# Patient Record
Sex: FEMALE | Race: WHITE | NOT HISPANIC OR LATINO | Employment: OTHER | URBAN - METROPOLITAN AREA
[De-identification: names, ages, dates, MRNs, and addresses within clinical notes are randomized per-mention and may not be internally consistent; named-entity substitution may affect disease eponyms.]

---

## 2017-02-22 ENCOUNTER — OFFICE VISIT (OUTPATIENT)
Dept: FAMILY MEDICINE | Facility: HOSPITAL | Age: 32
End: 2017-02-22
Attending: FAMILY MEDICINE
Payer: MEDICAID

## 2017-02-22 VITALS
BODY MASS INDEX: 33.55 KG/M2 | HEART RATE: 73 BPM | DIASTOLIC BLOOD PRESSURE: 86 MMHG | WEIGHT: 170.88 LBS | HEIGHT: 60 IN | SYSTOLIC BLOOD PRESSURE: 130 MMHG

## 2017-02-22 DIAGNOSIS — Z85.850 HX OF PAPILLARY THYROID CARCINOMA: ICD-10-CM

## 2017-02-22 DIAGNOSIS — E89.0 POSTOPERATIVE HYPOTHYROIDISM: ICD-10-CM

## 2017-02-22 DIAGNOSIS — R41.840 POOR CONCENTRATION: Primary | ICD-10-CM

## 2017-02-22 DIAGNOSIS — E56.9 MULTIPLE VITAMIN DEFICIENCY: ICD-10-CM

## 2017-02-22 PROCEDURE — 99214 OFFICE O/P EST MOD 30 MIN: CPT | Performed by: FAMILY MEDICINE

## 2017-02-22 NOTE — PROGRESS NOTES
Subjective:       Patient ID: Monique Rai is a 31 y.o. female.    Chief Complaint: Follow-up (Thyroid)    HPI Comments: This is a 30 yo F with PMH of hypothyroidism 2/2 thyroidectomy for papillary thyroid cancer in 2015.  Patient is here today with chief complaint of difficulty concentrating.  She says this has been something she has struggled with since high school and that she used to take medication (ritalin, adderral) for it.  She was never officially tested or diagnosed and does not currently have a psychiatrist.  She says she noticed her ability to concentrate was worsening after her thyroidectomy but that she wanted to situate other problems in her life before bringing it up (pregnant at the time, etc).  She says this difficulty causes problems in her daily life and that lately people have been commenting on her lack of ability to focus and pay attention.  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  She feels that this difficulty is aggravating her depression.  Aside from this, she feels her hypothyroid symptoms are well controlled on her current regimen of armour.  She does still experience mild cold intolerance but denies other symptoms.    Since her last clinic visit with us, she has followed up with her endocrinologist (Uriel) who has declared her in remission after completing tumor marker tests and imaging.  She is to continue to follow up with them regularly every 8 weeks.  Synthroid was discontinued and she is currently only on armour now.    She does admit she has not been taking all of her vitamins as prescribed but has been taking her armour.    Review of Systems   Constitutional: Negative.    HENT: Negative.    Eyes: Negative.    Respiratory: Negative.    Cardiovascular: Negative.    Gastrointestinal: Negative.    Endocrine: Positive for cold intolerance.    Genitourinary: Negative.    Musculoskeletal: Negative.    Skin: Negative.    Allergic/Immunologic: Negative.    Neurological: Negative.    Psychiatric/Behavioral: Positive for decreased concentration.       Objective:      Vitals:    02/22/17 0930   BP: 130/86   Pulse: 73     Physical Exam   Constitutional: She is oriented to person, place, and time. She appears well-developed and well-nourished. No distress.   HENT:   Head: Normocephalic and atraumatic.   Mouth/Throat: Oropharynx is clear and moist.   Eyes: EOM are normal. Pupils are equal, round, and reactive to light.   Neck: Neck supple. No thyroid mass present.   Athyroid s/p thyroidectomy, no palpable thyroid tissue remaining, no palpable masses or adenopathy   Cardiovascular: Normal rate, regular rhythm, normal heart sounds and intact distal pulses.  Exam reveals no gallop and no friction rub.    No murmur heard.  Pulmonary/Chest: Effort normal and breath sounds normal. No respiratory distress. She has no wheezes. She has no rales.   Abdominal: Soft. Bowel sounds are normal. She exhibits no distension. There is no tenderness.   Musculoskeletal: She exhibits no edema.   Lymphadenopathy:     She has no cervical adenopathy.   Neurological: She is alert and oriented to person, place, and time.   Skin: Skin is warm and dry.   Psychiatric: She has a normal mood and affect.       Assessment:       1. Poor concentration    2. Hx of papillary thyroid carcinoma    3. Multiple vitamin deficiency    4. Postoperative hypothyroidism        Plan:       Poor concentration  -     Ambulatory referral to Psychiatry     Hx of papillary thyroid carcinoma       - S/P thryoidectomy 2015, no post procedure radiation.  Following with Dr. Trevino, endocrinology.  Currently in remission.  Markers and imaging to be repeated q 8 weeks with endo to monitor for disease.    Multiple vitamin deficiency        - h/o sleeve gastrectomy and dumping syndrome        - discussed importance of  compliance with vitamin regimen and encouraged patient to take all of her medications.    Postoperative hypothyroidism        - well controlled on armour 180 mg       RCT 2 weeks

## 2017-02-23 NOTE — PROGRESS NOTES
I assume primary medical responsibility for this patient, I have reviewed the case history, findings, diagnosis and treatment plan with the resident and agree that the care is reasonable and necessary. This service has been performed by a resident without the presence of a teaching physician under the primary care exception  Leilani St  2/23/2017

## 2017-03-29 ENCOUNTER — OFFICE VISIT (OUTPATIENT)
Dept: FAMILY MEDICINE | Facility: HOSPITAL | Age: 32
End: 2017-03-29
Payer: MEDICAID

## 2017-03-29 VITALS
TEMPERATURE: 98 F | HEART RATE: 84 BPM | WEIGHT: 165.13 LBS | DIASTOLIC BLOOD PRESSURE: 68 MMHG | BODY MASS INDEX: 32.42 KG/M2 | SYSTOLIC BLOOD PRESSURE: 99 MMHG | HEIGHT: 60 IN

## 2017-03-29 DIAGNOSIS — N30.90 CYSTITIS: Primary | ICD-10-CM

## 2017-03-29 PROCEDURE — 99213 OFFICE O/P EST LOW 20 MIN: CPT | Performed by: FAMILY MEDICINE

## 2017-03-29 RX ORDER — SULFAMETHOXAZOLE AND TRIMETHOPRIM 800; 160 MG/1; MG/1
1 TABLET ORAL 2 TIMES DAILY
Qty: 10 TABLET | Refills: 0 | Status: ON HOLD | OUTPATIENT
Start: 2017-03-29 | End: 2017-04-02 | Stop reason: HOSPADM

## 2017-03-29 NOTE — PROGRESS NOTES
Subjective:       Patient ID: Monique Rai is a 32 y.o. female.    Chief Complaint: Fever and Pain    HPI   Patient is a 32 year old female here today with an urgent visit. She is complaining of left flank and back pain that radiates to the left abdomen and extends to the umbilicus. She states the pain started Monday and has progressively gotten worse. She started having a fever last night, and recorded a temperature of 102.5 orally. She rates the pain 3/10 since Monday, with intervals of 10/10 pain lasting for approximately 20 minutes. She states she had a recent bout of constipation last week for which she took a senna leaf, acai, digestive enzyme and probiotic blend sold OTC which provided her relief on Monday. BM have been normal for the patient since Monday. Patient took excedrin starting last night, with the last dose around 8am this morning. She states the fever will return without the excedrin. She complains of urinary frequency and urgency, but denies burning. Pt denies pain in her urine, but states it has been dark, cloudy and malodorous for about 2 weeks. She took an AZO pill this morning. She has a history of UTIs. She complains of nausea, but denies vomiting, denies diarrhea, denies dizziness. LMP just ended and she denies vaginal bleeding or discharge. Patient also denies blood in the stool and states it is her usual color and consistency.    Review of Systems   Constitutional: Positive for appetite change, chills, diaphoresis and fever.   HENT: Negative.  Negative for congestion and sore throat.    Respiratory: Negative.    Cardiovascular: Negative.    Gastrointestinal: Positive for abdominal pain and nausea. Negative for abdominal distention, anal bleeding, blood in stool, constipation, diarrhea and vomiting.   Endocrine: Negative.    Genitourinary: Positive for dysuria, flank pain, frequency and urgency.   Skin: Negative.    Allergic/Immunologic: Negative.    Hematological: Negative.     Psychiatric/Behavioral: Negative.        Objective:      Vitals:    03/29/17 1031   BP: 99/68   Pulse: 84   Temp: 97.7 °F (36.5 °C)     Physical Exam   Constitutional: She is oriented to person, place, and time. She appears well-developed and well-nourished.   Non toxic. NAD   HENT:   Head: Normocephalic and atraumatic.   Eyes: Conjunctivae and EOM are normal. Right eye exhibits no discharge. Left eye exhibits no discharge.   Neck: Normal range of motion. Neck supple.   Cardiovascular: Normal rate, regular rhythm and normal heart sounds.    Pulmonary/Chest: Effort normal and breath sounds normal.   Abdominal: Soft. Bowel sounds are normal. There is tenderness in the periumbilical area, left upper quadrant and left lower quadrant. There is CVA tenderness.   Left CVA tenderness. Pain with palpation of left upper and lower abdomen that elicits radiating pain to the left flank/CVA. NO rebound or guarding   Musculoskeletal: Normal range of motion.   Neurological: She is alert and oriented to person, place, and time.   Skin: Skin is warm and dry.   Psychiatric: She has a normal mood and affect. Her behavior is normal. Judgment and thought content normal.       Assessment:       1. Cystitis        Plan:       Cystitis  -     sulfamethoxazole-trimethoprim 800-160mg (BACTRIM DS) 800-160 mg Tab; Take 1 tablet by mouth 2 (two) times daily.  Dispense: 10 tablet; Refill: 0    Patient advised to return to clinic or ED if she experiences worsening of symptoms. UA and culture not obtained today due to patient taking AZO.  -If pt with recurrent symptoms of UTI, will plan on investigation with urine studies.       Return in about 3 days (around 4/1/2017).

## 2017-03-30 NOTE — PROGRESS NOTES
I assume primary medical responsibility for this patient, I have reviewed the case history, findings, diagnosis and treatment plan with the resident and agree that the care is reasonable and necessary. This service has been performed by a resident without the presence of a teaching physician under the primary care exception  Leilani St  3/30/2017

## 2017-03-31 ENCOUNTER — OFFICE VISIT (OUTPATIENT)
Dept: FAMILY MEDICINE | Facility: HOSPITAL | Age: 32
DRG: 690 | End: 2017-03-31
Attending: FAMILY MEDICINE
Payer: MEDICAID

## 2017-03-31 ENCOUNTER — HOSPITAL ENCOUNTER (INPATIENT)
Facility: HOSPITAL | Age: 32
LOS: 2 days | Discharge: HOME | DRG: 690 | End: 2017-04-02
Attending: EMERGENCY MEDICINE | Admitting: FAMILY MEDICINE
Payer: MEDICAID

## 2017-03-31 VITALS
HEART RATE: 81 BPM | TEMPERATURE: 99 F | WEIGHT: 169.75 LBS | BODY MASS INDEX: 33.15 KG/M2 | DIASTOLIC BLOOD PRESSURE: 63 MMHG | SYSTOLIC BLOOD PRESSURE: 96 MMHG

## 2017-03-31 DIAGNOSIS — R53.81 MALAISE: ICD-10-CM

## 2017-03-31 DIAGNOSIS — N12 PYELONEPHRITIS: Primary | ICD-10-CM

## 2017-03-31 DIAGNOSIS — R53.83 FATIGUE, UNSPECIFIED TYPE: ICD-10-CM

## 2017-03-31 DIAGNOSIS — F41.9 ANXIETY AND DEPRESSION: ICD-10-CM

## 2017-03-31 DIAGNOSIS — N10 ACUTE PYELONEPHRITIS: Primary | ICD-10-CM

## 2017-03-31 DIAGNOSIS — E89.0 POSTOPERATIVE HYPOTHYROIDISM: ICD-10-CM

## 2017-03-31 DIAGNOSIS — F32.A ANXIETY AND DEPRESSION: ICD-10-CM

## 2017-03-31 LAB
ALBUMIN SERPL BCP-MCNC: 3.2 G/DL
ALP SERPL-CCNC: 125 U/L
ALT SERPL W/O P-5'-P-CCNC: 83 U/L
ANION GAP SERPL CALC-SCNC: 10 MMOL/L
AST SERPL-CCNC: 37 U/L
BASOPHILS # BLD AUTO: 0.01 K/UL
BASOPHILS NFR BLD: 0.2 %
BILIRUB SERPL-MCNC: 0.5 MG/DL
BUN SERPL-MCNC: 11 MG/DL
CALCIUM SERPL-MCNC: 8.6 MG/DL
CHLORIDE SERPL-SCNC: 104 MMOL/L
CO2 SERPL-SCNC: 21 MMOL/L
CREAT SERPL-MCNC: 0.9 MG/DL
DIFFERENTIAL METHOD: ABNORMAL
EOSINOPHIL # BLD AUTO: 0.1 K/UL
EOSINOPHIL NFR BLD: 1.7 %
ERYTHROCYTE [DISTWIDTH] IN BLOOD BY AUTOMATED COUNT: 14 %
EST. GFR  (AFRICAN AMERICAN): >60 ML/MIN/1.73 M^2
EST. GFR  (NON AFRICAN AMERICAN): >60 ML/MIN/1.73 M^2
FERRITIN SERPL-MCNC: 71 NG/ML
GLUCOSE SERPL-MCNC: 78 MG/DL
GRAM STN SPEC: NORMAL
HCT VFR BLD AUTO: 31.7 %
HGB BLD-MCNC: 10.2 G/DL
LACTATE SERPL-SCNC: 0.8 MMOL/L
LYMPHOCYTES # BLD AUTO: 0.9 K/UL
LYMPHOCYTES NFR BLD: 16.2 %
MCH RBC QN AUTO: 26.7 PG
MCHC RBC AUTO-ENTMCNC: 32.2 %
MCV RBC AUTO: 83 FL
MONOCYTES # BLD AUTO: 0.6 K/UL
MONOCYTES NFR BLD: 9.9 %
NEUTROPHILS # BLD AUTO: 4.1 K/UL
NEUTROPHILS NFR BLD: 71.8 %
PLATELET # BLD AUTO: 171 K/UL
PMV BLD AUTO: 11 FL
POCT GLUCOSE: 89 MG/DL (ref 70–110)
POTASSIUM SERPL-SCNC: 3.9 MMOL/L
PROT SERPL-MCNC: 6.6 G/DL
RBC # BLD AUTO: 3.82 M/UL
SODIUM SERPL-SCNC: 135 MMOL/L
WBC # BLD AUTO: 5.74 K/UL

## 2017-03-31 PROCEDURE — 63600175 PHARM REV CODE 636 W HCPCS: Performed by: EMERGENCY MEDICINE

## 2017-03-31 PROCEDURE — 83540 ASSAY OF IRON: CPT

## 2017-03-31 PROCEDURE — 36415 COLL VENOUS BLD VENIPUNCTURE: CPT

## 2017-03-31 PROCEDURE — 99284 EMERGENCY DEPT VISIT MOD MDM: CPT | Mod: 25,27

## 2017-03-31 PROCEDURE — 63600175 PHARM REV CODE 636 W HCPCS: Performed by: FAMILY MEDICINE

## 2017-03-31 PROCEDURE — 87086 URINE CULTURE/COLONY COUNT: CPT

## 2017-03-31 PROCEDURE — 96375 TX/PRO/DX INJ NEW DRUG ADDON: CPT

## 2017-03-31 PROCEDURE — 84591 ASSAY OF NOS VITAMIN: CPT | Mod: 91

## 2017-03-31 PROCEDURE — 82728 ASSAY OF FERRITIN: CPT

## 2017-03-31 PROCEDURE — 85025 COMPLETE CBC W/AUTO DIFF WBC: CPT

## 2017-03-31 PROCEDURE — 84597 ASSAY OF VITAMIN K: CPT

## 2017-03-31 PROCEDURE — 25000003 PHARM REV CODE 250: Performed by: FAMILY MEDICINE

## 2017-03-31 PROCEDURE — 84252 ASSAY OF VITAMIN B-2: CPT

## 2017-03-31 PROCEDURE — 96365 THER/PROPH/DIAG IV INF INIT: CPT

## 2017-03-31 PROCEDURE — 82607 VITAMIN B-12: CPT

## 2017-03-31 PROCEDURE — 84590 ASSAY OF VITAMIN A: CPT

## 2017-03-31 PROCEDURE — 84207 ASSAY OF VITAMIN B-6: CPT

## 2017-03-31 PROCEDURE — 83605 ASSAY OF LACTIC ACID: CPT

## 2017-03-31 PROCEDURE — 84446 ASSAY OF VITAMIN E: CPT

## 2017-03-31 PROCEDURE — 84425 ASSAY OF VITAMIN B-1: CPT

## 2017-03-31 PROCEDURE — 94761 N-INVAS EAR/PLS OXIMETRY MLT: CPT

## 2017-03-31 PROCEDURE — 87205 SMEAR GRAM STAIN: CPT

## 2017-03-31 PROCEDURE — 80053 COMPREHEN METABOLIC PANEL: CPT

## 2017-03-31 PROCEDURE — 87040 BLOOD CULTURE FOR BACTERIA: CPT

## 2017-03-31 PROCEDURE — 82652 VIT D 1 25-DIHYDROXY: CPT

## 2017-03-31 PROCEDURE — 82746 ASSAY OF FOLIC ACID SERUM: CPT

## 2017-03-31 PROCEDURE — 84591 ASSAY OF NOS VITAMIN: CPT

## 2017-03-31 PROCEDURE — 11000001 HC ACUTE MED/SURG PRIVATE ROOM

## 2017-03-31 PROCEDURE — 25000003 PHARM REV CODE 250: Performed by: EMERGENCY MEDICINE

## 2017-03-31 RX ORDER — HYDROMORPHONE HYDROCHLORIDE 1 MG/ML
1 INJECTION, SOLUTION INTRAMUSCULAR; INTRAVENOUS; SUBCUTANEOUS
Status: COMPLETED | OUTPATIENT
Start: 2017-03-31 | End: 2017-03-31

## 2017-03-31 RX ORDER — CIPROFLOXACIN 2 MG/ML
400 INJECTION, SOLUTION INTRAVENOUS
Status: DISCONTINUED | OUTPATIENT
Start: 2017-03-31 | End: 2017-04-02 | Stop reason: HOSPADM

## 2017-03-31 RX ORDER — INSULIN ASPART 100 [IU]/ML
1-10 INJECTION, SOLUTION INTRAVENOUS; SUBCUTANEOUS
Status: DISCONTINUED | OUTPATIENT
Start: 2017-03-31 | End: 2017-03-31

## 2017-03-31 RX ORDER — ONDANSETRON 2 MG/ML
4 INJECTION INTRAMUSCULAR; INTRAVENOUS
Status: COMPLETED | OUTPATIENT
Start: 2017-03-31 | End: 2017-03-31

## 2017-03-31 RX ORDER — HYDROMORPHONE HYDROCHLORIDE 1 MG/ML
0.5 INJECTION, SOLUTION INTRAMUSCULAR; INTRAVENOUS; SUBCUTANEOUS EVERY 4 HOURS PRN
Status: DISCONTINUED | OUTPATIENT
Start: 2017-03-31 | End: 2017-04-02 | Stop reason: HOSPADM

## 2017-03-31 RX ORDER — ACETAMINOPHEN 325 MG/1
650 TABLET ORAL ONCE
Status: COMPLETED | OUTPATIENT
Start: 2017-03-31 | End: 2017-03-31

## 2017-03-31 RX ORDER — IBUPROFEN 200 MG
16 TABLET ORAL
Status: DISCONTINUED | OUTPATIENT
Start: 2017-03-31 | End: 2017-04-02 | Stop reason: HOSPADM

## 2017-03-31 RX ORDER — ONDANSETRON 2 MG/ML
4 INJECTION INTRAMUSCULAR; INTRAVENOUS EVERY 6 HOURS PRN
Status: DISCONTINUED | OUTPATIENT
Start: 2017-03-31 | End: 2017-04-02 | Stop reason: HOSPADM

## 2017-03-31 RX ORDER — SODIUM CHLORIDE 9 MG/ML
INJECTION, SOLUTION INTRAVENOUS CONTINUOUS
Status: ACTIVE | OUTPATIENT
Start: 2017-03-31 | End: 2017-04-01

## 2017-03-31 RX ORDER — GLUCAGON 1 MG
1 KIT INJECTION
Status: DISCONTINUED | OUTPATIENT
Start: 2017-03-31 | End: 2017-04-02 | Stop reason: HOSPADM

## 2017-03-31 RX ORDER — ESCITALOPRAM OXALATE 20 MG/1
20 TABLET ORAL DAILY
Status: DISCONTINUED | OUTPATIENT
Start: 2017-04-01 | End: 2017-04-02 | Stop reason: HOSPADM

## 2017-03-31 RX ORDER — IBUPROFEN 200 MG
24 TABLET ORAL
Status: DISCONTINUED | OUTPATIENT
Start: 2017-03-31 | End: 2017-04-02 | Stop reason: HOSPADM

## 2017-03-31 RX ADMIN — HYDROMORPHONE HYDROCHLORIDE 0.5 MG: 1 INJECTION, SOLUTION INTRAMUSCULAR; INTRAVENOUS; SUBCUTANEOUS at 08:03

## 2017-03-31 RX ADMIN — SODIUM CHLORIDE 1000 ML: 0.9 INJECTION, SOLUTION INTRAVENOUS at 03:03

## 2017-03-31 RX ADMIN — ONDANSETRON 4 MG: 2 INJECTION INTRAMUSCULAR; INTRAVENOUS at 08:03

## 2017-03-31 RX ADMIN — CIPROFLOXACIN 400 MG: 2 INJECTION, SOLUTION INTRAVENOUS at 06:03

## 2017-03-31 RX ADMIN — HYDROMORPHONE HYDROCHLORIDE 1 MG: 1 INJECTION, SOLUTION INTRAMUSCULAR; INTRAVENOUS; SUBCUTANEOUS at 03:03

## 2017-03-31 RX ADMIN — ACETAMINOPHEN 650 MG: 325 TABLET ORAL at 08:03

## 2017-03-31 RX ADMIN — CEFTRIAXONE 1 G: 1 INJECTION, SOLUTION INTRAVENOUS at 03:03

## 2017-03-31 RX ADMIN — ONDANSETRON 4 MG: 2 INJECTION INTRAMUSCULAR; INTRAVENOUS at 03:03

## 2017-03-31 RX ADMIN — SODIUM CHLORIDE: 0.9 INJECTION, SOLUTION INTRAVENOUS at 06:03

## 2017-03-31 NOTE — PROGRESS NOTES
Subjective:       Patient ID: Monique Rai is a 32 y.o. female.    Chief Complaint: Pyelonephritis (er follow up)    HPI   33 yo F with PMH of hypothyroidism 2/2 thyroidectomy for papillary thyroid cancer in 2015, s/p gastric bypass surgery presents to clinic for ER f/u. She was seen last night for abd pain and subsequently diagnosed with Acute Pyelo. She as given Rocephin x 1 and started on Keflex. She presents today stating her symptoms are unchanged. Her back pain is worsening and limiting her daily functioning. She reports subjective fevers and chills this AM. She also reports inability to tolerate PO due to nausea. She denies any urinary symptoms        Review of Systems   Constitutional: Positive for chills, diaphoresis, fatigue and fever. Negative for appetite change.   HENT: Negative for congestion, rhinorrhea, sneezing, sore throat, tinnitus and trouble swallowing.    Eyes: Negative for photophobia and itching.   Respiratory: Negative for cough, shortness of breath and wheezing.    Cardiovascular: Negative for chest pain and palpitations.   Gastrointestinal: Negative for abdominal pain, constipation, diarrhea, nausea and vomiting.   Endocrine: Negative for cold intolerance and heat intolerance.   Genitourinary: Negative for dysuria, frequency, urgency, vaginal bleeding, vaginal discharge and vaginal pain.   Musculoskeletal: Positive for back pain. Negative for arthralgias and gait problem.   Skin: Negative for rash.   Neurological: Negative for dizziness, tremors, weakness, light-headedness and headaches.   Hematological: Negative for adenopathy.   Psychiatric/Behavioral: Negative for agitation and sleep disturbance. The patient is not nervous/anxious.        Objective:      Vitals:    03/31/17 1150   BP: 96/63   Pulse: 81   Temp: 98.5 °F (36.9 °C)     Physical Exam   Constitutional: She is oriented to person, place, and time. She appears well-developed and well-nourished. No distress.   Moderate  distress 2/2 to pain   HENT:   Head: Normocephalic and atraumatic.   Right Ear: External ear normal.   Left Ear: External ear normal.   Mouth/Throat: Oropharynx is clear and moist.   Eyes: Conjunctivae are normal. Pupils are equal, round, and reactive to light.   Neck: Normal range of motion. Neck supple. No JVD present. No thyromegaly present.   Cardiovascular: Normal rate, regular rhythm, normal heart sounds and intact distal pulses.    Pulmonary/Chest: Effort normal and breath sounds normal. She has no wheezes. She has no rales.   Abdominal: Soft. Bowel sounds are normal. She exhibits no distension. There is no tenderness.   Musculoskeletal: She exhibits no edema.   Severe CVA tenderness- Left    Lymphadenopathy:     She has no cervical adenopathy.   Neurological: She is alert and oriented to person, place, and time.   Skin: Skin is warm and dry. No rash noted.   Psychiatric: She has a normal mood and affect. Thought content normal.       Assessment:       1. Acute pyelonephritis        Plan:       Acute pyelonephritis  -Urine culture pending- No gram stain obtained  -s/p rocephin x 1 in ED, started on keflex.   -Due to continued pain limiting her daily functioning, borderline hypotension, hx of gastric bypass and inability to tolerate PO hydration, we will advised her to seek inpatient care for IV Abx and Hydration.       Return for Present to ED.

## 2017-03-31 NOTE — ED NOTES
Pt was sent from [facility] clinic for admission and IV antibiotics. Reports she was seen here for flank pain on Wednesday and started on bactrim. Had no improvement, so returned last night. Was diagnosed with acute pyelonephritis, received IV rocephin, and was sent home with PO Keflex last night, but denies any improvement. Pt c/o generalized abdominal pain and bilateral flank pain. Pain is worst on left side. Left abdomen is tender to palpation. Also c/o nausea. Denies fever. Skin is warm, dry.

## 2017-03-31 NOTE — H&P
History & Physical  [facility]      SUBJECTIVE:     History of Present Illness:  33 y/o female with PMH of recurrent UTI's and s/p gastric bypass surgery in 2014, and s/p thyroidectomy for thyroid CA, and periodic hypoglycemia events here for left-sided abdominal pain and fever at home up to 102F. Of note, pt was seen in clinic and the ER (last night) recently and diagnosed with acute pyelo and given Rocephin x 1 and Keflex PO without change in her symptoms.     Nothing makes her pain better and movement and palpation makes the pain worse. She states she was constipated all last week and took stool softeners, and had several bowel movements on Monday, since that time no BM's. Denies HA, SOB, CP, n/v/d. No vaginal bleeding. Denies blood in her stool or urine.       (Not in a hospital admission)    Review of patient's allergies indicates:  No Known Allergies    Past Medical History:   Diagnosis Date    Cancer     Thyroid disease      Past Surgical History:   Procedure Laterality Date    STOMACH SURGERY       History reviewed. No pertinent family history.  Social History   Substance Use Topics    Smoking status: Current Some Day Smoker     Packs/day: 0.50     Years: 3.00    Smokeless tobacco: None    Alcohol use No        Review of Systems:  As per Subjective  Pos: L-CVA tenderness, nausea, fever  Neg: CP, SOB, chills    OBJECTIVE:     Vital Signs (Most Recent)  Temp: 99.6 °F (37.6 °C) (03/31/17 1424)  Pulse: 89 (03/31/17 1424)  Resp: 20 (03/31/17 1424)  BP: 130/69 (03/31/17 1424)  SpO2: 100 % (03/31/17 1424)    Physical Exam:  Gen: NAD  HEENT: NC, AT  Chest: CTABL  CVS: RRR, no m/r/g  Abdomen: soft, ND, no masses, +BS, LLQ TTP and L-CVA tenderness  Ext: no edema, pulses 2+   Skin: ro rashes  Neuro: A&O x 3, CN's grossly intact, sensation intact to light touch  Psych: Normal mood, affect, thought content    LABS  CBC    Recent Labs  Lab 03/30/17 2037 03/31/17  1533   WBC 7.66 5.74   RBC 4.40 3.82*    HGB 11.9* 10.2*   HCT 36.3* 31.7*    171   MCV 83 83   MCH 27.0 26.7*   MCHC 32.8 32.2     BMP    Recent Labs  Lab 03/30/17 2113 03/31/17  1533    135*   K 4.0 3.9   CO2 19* 21*    104   BUN 12 11   CREATININE 0.9 0.9       POCT-Glucose  No results found for: POCTGLUCOSE      Recent Labs  Lab 03/30/17 2113 03/31/17  1533   CALCIUM 8.1* 8.6*     LFT    Recent Labs  Lab 03/30/17 2113 03/31/17  1533   PROT 6.3 6.6   ALBUMIN 3.1* 3.2*   BILITOT 0.5 0.5   AST 73* 37   ALKPHOS 125 125   * 83*     UA    Recent Labs  Lab 03/30/17  2047   COLORU Yellow   SPECGRAV 1.025   PHUR 6.0   PROTEINUA 1+*   BACTERIA Few*     LAST HbA1c  No results found for: HGBA1C      Diagnostic Results:  Imaging Results     None          ASSESSMENT/PLAN:   31 y/o female with PMH of recurrent UTI's, s/p gastric bypass, s/p thyroidectomy for thyroid CA, and periodic hypoglycemia events here for left-sided abdominal pain and fever at home up to 102F.    Plan: Admit to inpatient for acute pyelonephritis    Pyelonephritis  - VSS  - Fever at home up to 102, but temp 99.6 on admit  - WBC wnl   - H&H stable  - LA pending  - CT suspicious for pyelo with no hydro  - UA with 1+ leuks, 1+ protein, trace blood, and trace ketones  - Urine micro, few bacteria  - POCT urine preg - negative  - Pain control with Dilaudid  - IV fluids  - Rocephin 1g q24H  - Phenergan for nausea    Hypothyroid  - Pt takes Armor at home    Ppx: SCD's  Diet: regular    Dispo: f/u urine and blood Cx's, monitor pain, fluids    3/31/2017 Patrick Swan M.D.

## 2017-03-31 NOTE — ED PROVIDER NOTES
Encounter Date: 3/31/2017       History     Chief Complaint   Patient presents with    Flank Pain     right and left flank pain from kidney infection; was sent her for further evaluation by Dr Lopez because oral antibiotics since Wednesday have not improved and received IV antibiotic here last night     Review of patient's allergies indicates:  No Known Allergies  HPI Comments: Patient is a 32 year old female with left pyelonephritis who is here to be admitted.  + fever, chills, left flank pain, nausea.  No vomiting.  No diarrhea.  Constant malaise.  Nothing makes better including Keflex and Norco.  Severity - severe.  NO hypotension, lightheadedness.     The history is provided by the patient.     Past Medical History:   Diagnosis Date    Cancer     Thyroid disease      Past Surgical History:   Procedure Laterality Date    STOMACH SURGERY       History reviewed. No pertinent family history.  Social History   Substance Use Topics    Smoking status: Current Some Day Smoker     Packs/day: 0.50     Years: 3.00    Smokeless tobacco: None    Alcohol use No     Review of Systems   Constitutional: Positive for chills, fatigue and fever. Negative for diaphoresis.   HENT: Negative for sore throat.    Eyes: Negative for visual disturbance.   Respiratory: Negative for shortness of breath and wheezing.    Cardiovascular: Negative for chest pain and palpitations.   Gastrointestinal: Positive for nausea. Negative for abdominal pain, diarrhea and vomiting.   Genitourinary: Positive for flank pain. Negative for dysuria, pelvic pain and vaginal discharge.   Musculoskeletal: Negative for back pain, joint swelling and neck stiffness.   Skin: Negative for rash.   Neurological: Negative for weakness and numbness.   Hematological: Does not bruise/bleed easily.       Physical Exam   Initial Vitals   BP Pulse Resp Temp SpO2   03/31/17 1424 03/31/17 1424 03/31/17 1424 03/31/17 1424 03/31/17 1424   130/69 89 20 99.6 °F (37.6 °C) 100 %      Physical Exam    Nursing note and vitals reviewed.  Constitutional: She appears well-developed and well-nourished. No distress.   HENT:   Head: Normocephalic and atraumatic.   Eyes: Conjunctivae are normal.   Neck: Normal range of motion. Neck supple.   Cardiovascular: Normal rate and regular rhythm.   Pulmonary/Chest: Breath sounds normal. No respiratory distress. She has no wheezes.   Abdominal: Soft. There is tenderness (mild llq tenderness). There is no rebound and no guarding.   Left CVA tenderness   Musculoskeletal: Normal range of motion.   Neurological: She is alert and oriented to person, place, and time. She has normal strength. No cranial nerve deficit or sensory deficit.   Skin: Skin is warm and dry. No rash noted.   Psychiatric: She has a normal mood and affect.         ED Course   Procedures  Labs Reviewed   CULTURE, BLOOD   CULTURE, BLOOD   LACTIC ACID, PLASMA   CBC W/ AUTO DIFFERENTIAL   COMPREHENSIVE METABOLIC PANEL                               ED Course     Clinical Impression:   The primary encounter diagnosis was Pyelonephritis. A diagnosis of Malaise was also pertinent to this visit.          Chidi Enrique MD  03/31/17 1507       Chidi Enrique MD  03/31/17 1509

## 2017-03-31 NOTE — IP AVS SNAPSHOT
[facility]  [address]  [place] [address]  Phone: [phone]           Patient Discharge Instructions   Our goal is to set you up for success. This packet includes information on your condition, medications, and your home care.  It will help you care for yourself to prevent having to return to the hospital.     Please ask your nurse if you have any questions.      There are many details to remember when preparing to leave the hospital. Here is what you will need to do:    1. Take your medicine. If you are prescribed medications, review your Medication List on the following pages. You may have new medications to  at the pharmacy and others that you'll need to stop taking. Review the instructions for how and when to take your medications. Talk with your doctor or nurses if you are unsure of what to do.     2. Go to your follow-up appointments. Specific follow-up information is listed in the following pages. Your may be contacted by a nurse or clinical provider about future appointments. Be sure we have all of the phone numbers to reach you. Please contact your provider's office if you are unable to make an appointment.     3. Watch for warning signs. Your doctor or nurse will give you detailed warning signs to watch for and when to call for assistance. These instructions may also include educational information about your condition. If you experience any of warning signs to your health, call your doctor.               ** Verify the list of medication(s) below is accurate and up to date. Carry this with you in case of emergency. If your medications have changed, please notify your healthcare provider.             Medication List      START taking these medications        Additional Info                      ciprofloxacin HCl 500 MG tablet   Commonly known as:  CIPRO   Quantity:  8 tablet   Refills:  0   Dose:  500 mg    Instructions:  Take 1 tablet (500 mg total) by mouth every 12  (twelve) hours.     Begin Date    AM    Noon    PM    Bedtime         CONTINUE taking these medications        Additional Info                      acarbose 25 MG Tab   Commonly known as:  PRECOSE   Refills:  11    Instructions:  TK 1 T PO TID WITH MEALS     Begin Date    AM    Noon    PM    Bedtime       ARMOUR THYROID 180 mg Tab   Refills:  11   Generic drug:  thyroid (pork)    Last time this was given:  1 tablet on 4/2/2017  6:11 AM   Instructions:  TK 1 T PO QAM BEFORE BREAKFAST     Begin Date    AM    Noon    PM    Bedtime       B-12 KIT INJ   Refills:  0    Instructions:  Inject as directed every 30 days.     Begin Date    AM    Noon    PM    Bedtime       cholecalciferol (vitamin D3) 50,000 unit capsule   Quantity:  6 capsule   Refills:  0   Dose:  21979 Units   Comments:  One tablet PO every 7th day for 6 weeks.    Instructions:  Take 1 capsule (50,000 Units total) by mouth every 7 days.     Begin Date    AM    Noon    PM    Bedtime       escitalopram oxalate 20 MG tablet   Commonly known as:  LEXAPRO   Quantity:  30 tablet   Refills:  11   Dose:  20 mg    Last time this was given:  20 mg on 4/2/2017  8:51 AM   Instructions:  Take 1 tablet (20 mg total) by mouth once daily.     Begin Date    AM    Noon    PM    Bedtime       hydrocodone-acetaminophen 7.5-325mg 7.5-325 mg per tablet   Commonly known as:  NORCO   Quantity:  12 tablet   Refills:  0   Dose:  1 tablet    Instructions:  Take 1 tablet by mouth every 6 (six) hours as needed for Pain.     Begin Date    AM    Noon    PM    Bedtime       lactulose 20 gram/30 mL Soln   Commonly known as:  CHRONULAC   Quantity:  150 mL   Refills:  0   Dose:  10 g    Instructions:  Take 15 mLs (10 g total) by mouth 2 (two) times daily as needed (Constipation).     Begin Date    AM    Noon    PM    Bedtime       ondansetron 4 MG tablet   Commonly known as:  ZOFRAN   Quantity:  10 tablet   Refills:  0   Dose:  4 mg    Instructions:  Take 1 tablet (4 mg total) by mouth every  6 (six) hours as needed.     Begin Date    AM    Noon    PM    Bedtime       VITAMIN C 1000 MG tablet   Refills:  0   Dose:  1000 mg   Generic drug:  ascorbic acid (vitamin C)    Instructions:  Take 1,000 mg by mouth once daily.     Begin Date    AM    Noon    PM    Bedtime         STOP taking these medications     cephALEXin 500 MG capsule   Commonly known as:  KEFLEX       sulfamethoxazole-trimethoprim 800-160mg 800-160 mg Tab   Commonly known as:  BACTRIM DS            Where to Get Your Medications      You can get these medications from any pharmacy     Bring a paper prescription for each of these medications     ciprofloxacin HCl 500 MG tablet                  Please bring to all follow up appointments:    1. A copy of your discharge instructions.  2. All medicines you are currently taking in their original bottles.  3. Identification and insurance card.    Please arrive 15 minutes ahead of scheduled appointment time.    Please call 24 hours in advance if you must reschedule your appointment and/or time.        Follow-up Information     Follow up with Ran Lopez MD In 1 week.    Specialty:  Family Medicine    Contact information:    [address] [place] [address]  [place] [id number] [phone]          Discharge Instructions     Future Orders    Activity as tolerated     Call MD for:  difficulty breathing or increased cough     Call MD for:  increased confusion or weakness     Call MD for:  persistent dizziness, light-headedness, or visual disturbances     Call MD for:  persistent nausea and vomiting or diarrhea     Call MD for:  redness, tenderness, or signs of infection (pain, swelling, redness, odor or green/yellow discharge around incision site)     Call MD for:  severe persistent headache     Call MD for:  severe uncontrolled pain     Call MD for:  temperature >100.4     Call MD for:  worsening rash     Diet general     Questions:    Total calories:      Fat restriction, if any:      Protein restriction, if any:    "   Na restriction, if any:      Fluid restriction:      Additional restrictions:        Discharge References/Attachments     CIPROFLOXACIN TABLETS (ENGLISH)        Primary Diagnosis     Your primary diagnosis was:  Kidney Infection      Admission Information     Date & Time Provider Department CSN    3/31/2017  2:40 PM Boy Malone III, MD [facility] [id number]      Care Providers     Provider Role Specialty Primary office phone    Boy Malone III, MD Attending Provider Family Medicine [phone]      Your Vitals Were     BP Pulse Temp Resp Height Weight    105/52 (Patient Position: Lying, BP Method: Automatic) 68 97.4 °F (36.3 °C) (Oral) 17 5' 4" (1.626 m) 77.1 kg (170 lb)    Last Period SpO2 BMI          03/21/2017 (Exact Date) 98% 29.18 kg/m2        Recent Lab Values     No lab values to display.      Pending Labs     Order Current Status    Calcitriol In process    Niacin (vitamin B3) In process    Vitam B7, H (Biotin) In process    Vitamin A In process    Vitamin B1 In process    Vitamin B2 In process    Vitamin B5 (Pantothenic Acid) In process    Vitamin B6 In process    Vitamin E In process    Vitamin K In process    Blood Culture #2 **CANNOT BE ORDERED STAT** Preliminary result    Blood culture Preliminary result      Allergies as of 4/2/2017     No Known Allergies      [facility]     [facility] Nurse Care Line - 24/7 Assistance  Unless otherwise directed by your provider, please contact [facility], our nurse care line that is available for 24/7 assistance.     Registered nurses in the [facility] provide clinical advisement, health education, appointment booking, and other advisory services.  Call for this free service at [phone].        Advance Directives     An advance directive is a document which, in the event you are no longer able to make decisions for yourself, tells your healthcare team what kind of treatment you do or do not want to receive, or " who you would like to make those decisions for you.  If you do not currently have an advance directive, [facility] encourages you to create one.  For more information call:  [phone]), [phone] ([phone]),  or log on to [email or web address].        Smoking Cessation     If you would like to quit smoking:   You may be eligible for free services if you are a [place] resident and started smoking cigarettes before September 1, 1988.  Call the Smoking Cessation Trust (SCT) toll free at ([phone]) 308-6052 or [phone].   Call 9-156-QUIT-NOW if you do not meet the above criteria.   Contact us via email: [email or web address]   View our website for more information: [email or web address]        Language Assistance Services     ATTENTION: Language assistance services are available, free of charge. Please call [phone].      ATENCIÓN: Si habla rosi, tiene a carrera disposición servicios gratuitos de asistencia lingüística. Llame al [phone].     [facility] Ý: N?u b?n nói Ti?ng Vi?t, có các d?ch v? h? tr? ngôn ng? mi?n phí dành cho b?n. G?i s? [phone].         [facility] complies with applicable Federal civil rights laws and does not discriminate on the basis of race, color, national origin, age, disability, or sex.

## 2017-04-01 LAB
ALBUMIN SERPL BCP-MCNC: 2.4 G/DL
ALP SERPL-CCNC: 125 U/L
ALT SERPL W/O P-5'-P-CCNC: 73 U/L
ANION GAP SERPL CALC-SCNC: 7 MMOL/L
AST SERPL-CCNC: 58 U/L
BACTERIA UR CULT: NO GROWTH
BASOPHILS # BLD AUTO: 0.02 K/UL
BASOPHILS NFR BLD: 0.5 %
BILIRUB SERPL-MCNC: 0.2 MG/DL
BUN SERPL-MCNC: 7 MG/DL
CALCIUM SERPL-MCNC: 7.8 MG/DL
CHLORIDE SERPL-SCNC: 108 MMOL/L
CO2 SERPL-SCNC: 23 MMOL/L
CREAT SERPL-MCNC: 0.8 MG/DL
DIFFERENTIAL METHOD: ABNORMAL
EOSINOPHIL # BLD AUTO: 0.1 K/UL
EOSINOPHIL NFR BLD: 3.2 %
ERYTHROCYTE [DISTWIDTH] IN BLOOD BY AUTOMATED COUNT: 14.2 %
EST. GFR  (AFRICAN AMERICAN): >60 ML/MIN/1.73 M^2
EST. GFR  (NON AFRICAN AMERICAN): >60 ML/MIN/1.73 M^2
FOLATE SERPL-MCNC: 4.2 NG/ML
GLUCOSE SERPL-MCNC: 90 MG/DL
HCT VFR BLD AUTO: 27.6 %
HGB BLD-MCNC: 8.9 G/DL
IRON SERPL-MCNC: 13 UG/DL
LYMPHOCYTES # BLD AUTO: 1.4 K/UL
LYMPHOCYTES NFR BLD: 31.4 %
MAGNESIUM SERPL-MCNC: 1.8 MG/DL
MCH RBC QN AUTO: 27 PG
MCHC RBC AUTO-ENTMCNC: 32.2 %
MCV RBC AUTO: 84 FL
MONOCYTES # BLD AUTO: 0.6 K/UL
MONOCYTES NFR BLD: 13.5 %
NEUTROPHILS # BLD AUTO: 2.3 K/UL
NEUTROPHILS NFR BLD: 51.4 %
PHOSPHATE SERPL-MCNC: 2.8 MG/DL
PLATELET # BLD AUTO: 136 K/UL
PMV BLD AUTO: 10.5 FL
POCT GLUCOSE: 100 MG/DL (ref 70–110)
POCT GLUCOSE: 103 MG/DL (ref 70–110)
POCT GLUCOSE: 76 MG/DL (ref 70–110)
POCT GLUCOSE: 99 MG/DL (ref 70–110)
POTASSIUM SERPL-SCNC: 4 MMOL/L
PROT SERPL-MCNC: 5 G/DL
RBC # BLD AUTO: 3.3 M/UL
SATURATED IRON: 4 %
SODIUM SERPL-SCNC: 138 MMOL/L
TOTAL IRON BINDING CAPACITY: 354 UG/DL
TRANSFERRIN SERPL-MCNC: 239 MG/DL
TRANSFERRIN SERPL-MCNC: 239 MG/DL
VIT B12 SERPL-MCNC: 565 PG/ML
WBC # BLD AUTO: 4.43 K/UL

## 2017-04-01 PROCEDURE — 94761 N-INVAS EAR/PLS OXIMETRY MLT: CPT

## 2017-04-01 PROCEDURE — 85025 COMPLETE CBC W/AUTO DIFF WBC: CPT

## 2017-04-01 PROCEDURE — 11000001 HC ACUTE MED/SURG PRIVATE ROOM

## 2017-04-01 PROCEDURE — 25000003 PHARM REV CODE 250: Performed by: FAMILY MEDICINE

## 2017-04-01 PROCEDURE — 84100 ASSAY OF PHOSPHORUS: CPT

## 2017-04-01 PROCEDURE — 36415 COLL VENOUS BLD VENIPUNCTURE: CPT

## 2017-04-01 PROCEDURE — 25000003 PHARM REV CODE 250: Performed by: STUDENT IN AN ORGANIZED HEALTH CARE EDUCATION/TRAINING PROGRAM

## 2017-04-01 PROCEDURE — 80053 COMPREHEN METABOLIC PANEL: CPT

## 2017-04-01 PROCEDURE — 83735 ASSAY OF MAGNESIUM: CPT

## 2017-04-01 PROCEDURE — 63600175 PHARM REV CODE 636 W HCPCS: Performed by: FAMILY MEDICINE

## 2017-04-01 RX ORDER — POLYETHYLENE GLYCOL 3350 17 G/17G
17 POWDER, FOR SOLUTION ORAL DAILY
Status: DISCONTINUED | OUTPATIENT
Start: 2017-04-01 | End: 2017-04-02 | Stop reason: HOSPADM

## 2017-04-01 RX ORDER — ACETAMINOPHEN 325 MG/1
650 TABLET ORAL ONCE
Status: COMPLETED | OUTPATIENT
Start: 2017-04-01 | End: 2017-04-01

## 2017-04-01 RX ORDER — FLUCONAZOLE 100 MG/1
100 TABLET ORAL ONCE
Status: COMPLETED | OUTPATIENT
Start: 2017-04-01 | End: 2017-04-01

## 2017-04-01 RX ADMIN — ONDANSETRON 4 MG: 2 INJECTION INTRAMUSCULAR; INTRAVENOUS at 05:04

## 2017-04-01 RX ADMIN — ESCITALOPRAM OXALATE 20 MG: 20 TABLET ORAL at 08:04

## 2017-04-01 RX ADMIN — Medication 1 TABLET: at 08:04

## 2017-04-01 RX ADMIN — CIPROFLOXACIN 400 MG: 2 INJECTION, SOLUTION INTRAVENOUS at 05:04

## 2017-04-01 RX ADMIN — FLUCONAZOLE 100 MG: 100 TABLET ORAL at 12:04

## 2017-04-01 RX ADMIN — POLYETHYLENE GLYCOL 3350 17 G: 17 POWDER, FOR SOLUTION ORAL at 07:04

## 2017-04-01 RX ADMIN — CEFTRIAXONE 1 G: 1 INJECTION, SOLUTION INTRAVENOUS at 04:04

## 2017-04-01 RX ADMIN — HYDROMORPHONE HYDROCHLORIDE 0.5 MG: 1 INJECTION, SOLUTION INTRAMUSCULAR; INTRAVENOUS; SUBCUTANEOUS at 05:04

## 2017-04-01 RX ADMIN — ACETAMINOPHEN 650 MG: 325 TABLET ORAL at 06:04

## 2017-04-01 RX ADMIN — SODIUM CHLORIDE: 0.9 INJECTION, SOLUTION INTRAVENOUS at 05:04

## 2017-04-01 NOTE — PLAN OF CARE
Problem: Patient Care Overview  Goal: Plan of Care Review  Outcome: Ongoing (interventions implemented as appropriate)  Pt on RA, SPO2  98%.  Pt in no apparent respiratory distress. Will continue to monitor.

## 2017-04-01 NOTE — PROGRESS NOTES
Progress Note  [facility]  Admit Date: 3/31/2017   LOS: 1 day   SUBJECTIVE:   Follow-up For: Pyelonephritis    Patient seen and examined this AM. States abdominal pain has improved. Does still endorse chills. Had fever overnight 101.1 F that resolved with acetaminophen. Denies dysuria, urgency, hematuria, vomiting. Does complain of mild vaginal itching and nausea.    ROS   As stated above    OBJECTIVE:   Vital Signs (Most Recent)  Temp: 97.9 °F (36.6 °C) (04/01/17 0817)  Pulse: 79 (04/01/17 0817)  Resp: 15 (04/01/17 0817)  BP: (!) 102/56 (04/01/17 0817)  SpO2: 98 % (04/01/17 0817)    I & O (Last 24H):  Intake/Output Summary (Last 24 hours) at 04/01/17 0907  Last data filed at 04/01/17 0600   Gross per 24 hour   Intake             2150 ml   Output              500 ml   Net             1650 ml     Wt Readings from Last 3 Encounters:   03/31/17 77.1 kg (170 lb)   03/31/17 77 kg (169 lb 12.1 oz)   03/30/17 74.8 kg (165 lb)       Current Diet Order   Procedures    Diet Adult Regular        Physical Exam   Constitutional: She is oriented to person, place, and time and well-developed, well-nourished, and in no distress.   HENT:   Head: Normocephalic and atraumatic.   Eyes: EOM are normal.   Cardiovascular: Normal rate, regular rhythm and normal heart sounds.    Pulmonary/Chest: Effort normal and breath sounds normal.   Abdominal: Soft. Bowel sounds are normal. There is tenderness (mild supraprubic and LLQ tenderness to palpation).   Musculoskeletal: Normal range of motion.   Mild left CVA tenderness   Neurological: She is alert and oriented to person, place, and time.   Skin: Skin is warm and dry.         Laboratory Data:  CBC    Recent Labs  Lab 03/30/17  2037 03/31/17  1533 04/01/17  0340   WBC 7.66 5.74 4.43   RBC 4.40 3.82* 3.30*   HGB 11.9* 10.2* 8.9*   HCT 36.3* 31.7* 27.6*    171 136*   MCV 83 83 84   MCH 27.0 26.7* 27.0   MCHC 32.8 32.2 32.2     CMP    Recent Labs  Lab 03/30/17  2113 03/31/17  4511  04/01/17  0340   CALCIUM 8.1* 8.6* 7.8*   PROT 6.3 6.6 5.0*    135* 138   K 4.0 3.9 4.0   CO2 19* 21* 23    104 108   BUN 12 11 7   CREATININE 0.9 0.9 0.8   ALKPHOS 125 125 125   * 83* 73*   AST 73* 37 58*   BILITOT 0.5 0.5 0.2     POCT-Glucose  POCT Glucose   Date Value Ref Range Status   04/01/2017 103 70 - 110 mg/dL Final   03/31/2017 89 70 - 110 mg/dL Final     [facility]  MICRO  Microbiology Results (last 7 days)     Procedure Component Value Units Date/Time    Blood Culture #2 **CANNOT BE ORDERED STAT** [[id number]] Collected:  03/31/17 1530    Order Status:  Completed Specimen:  Blood from Peripheral, Antecubital, Left Updated:  04/01/17 0145     Blood Culture, Routine No Growth to date    Blood culture [[id number]] Collected:  03/31/17 1520    Order Status:  Completed Specimen:  Blood from Peripheral, Antecubital, Right Updated:  04/01/17 0145     Blood Culture, Routine No Growth to date    Gram stain [[id number]] Collected:  03/31/17 1504    Order Status:  Completed Specimen:  Urine from Urine, Clean Catch Updated:  03/31/17 1740     Gram Stain Result Few WBC's      No organisms seen      17:30  03/31/2017    Urine culture [[id number]] Collected:  03/31/17 1504    Order Status:  Sent Specimen:  Urine from Urine, Clean Catch Updated:  03/31/17 1649    Gram stain [[id number]]     Order Status:  Completed Specimen:  Urine from Urine     Gram stain [[id number]]     Order Status:  Canceled Specimen:  Urine from Urine           ASSESSMENT/PLAN:   Monique Rai is a 32 y.o. female with PMH of recurrent UTI's, s/p gastric bypass (gastric sleeve), s/p thyroidectomy for thyroid CA, and periodic hypoglycemia admitted for pyelonephritis.     Pyelonephritis  - Fever at home up to 102, but temp 99.6 on admit; 1 L bolus fluids ED and IV fluids overnight  - Fever 101.1 overnight however responded to Tylenol.  -97.9 F most recent temp   -WBC and [place] wnl. POCT urine preg - negative  - CT suspicious for pyelo  with no hydro  - UA with 1+ leuks, 1+ protein, trace blood, and trace ketones on admit. Urine micro, few bacteria on admit  -Blood cx NGTD, urine cx in progress  - Pain control with Dilaudid  - Rocephin 1g q24h, cipro 400mg q 12 and Phenergan for nausea  -Due to patient's hx of gastric bypass, vitamin levels ordered. Awaiting results     Hypothyroid  - Pt takes Armor at home    Dispo: f/u cultures, monitor temp and pain     4/1/2017 Sheryl Galdamez MD  9:07 AM

## 2017-04-01 NOTE — PLAN OF CARE
Problem: Patient Care Overview  Goal: Plan of Care Review  Outcome: Ongoing (interventions implemented as appropriate)  Patient awake, alert and oriented. Patient received medications per MD order. Patient complained of pain. Patient received medication for pain per MD order. Patient complained of nausea. Patient received nausea medication per MD order. Call bell within reach. Bed in low position. Bed locked.

## 2017-04-01 NOTE — PLAN OF CARE
s/p gastric bypass surgery who presents with abdominal pain and fever. She has noticed a fever up to 102.       04/01/17 1807   Discharge Assessment   Assessment Type Discharge Planning Assessment   Confirmed/corrected address and phone number on facesheet? Yes   Assessment information obtained from? Patient   Expected Length of Stay (days) (to be determined)   Prior to hospitilization cognitive status: Alert/Oriented   Prior to hospitalization functional status: Independent   Current cognitive status: Alert/Oriented   Current Functional Status: Independent   Arrived From home or self-care   Lives With significant other;child(rafael), dependent   Able to Return to Prior Arrangements yes   Is patient able to care for self after discharge? Yes   How many people do you have in your home that can help with your care after discharge? 1   Who are your caregiver(s) and their phone number(s)? RANDALL Muñiz  [phone]   Patient's perception of discharge disposition home or selfcare   Readmission Within The Last 30 Days no previous admission in last 30 days   Patient currently being followed by outpatient case management? No   Patient currently receives home health services? No   Does the patient currently use HME? No   Patient currently receives private duty nursing? No   Patient currently receives any other outside agency services? No   Equipment Currently Used at Home none   Do you have any problems affording any of your prescribed medications? No   Is the patient taking medications as prescribed? yes   Do you have any financial concerns preventing you from receiving the healthcare you need? No   Does the patient have transportation to healthcare appointments? Yes   Transportation Available car;family or friend will provide   On Dialysis? No   Does the patient receive services at the Coumadin Clinic? No   Are there any open cases? No   Discharge Plan A Home   Discharge Plan B Home with family   Patient/Family In  Agreement With Plan yes     Chio Vazquez RN Transitional Navigator  [phone]

## 2017-04-01 NOTE — PLAN OF CARE
Problem: Patient Care Overview  Goal: Plan of Care Review  Outcome: Ongoing (interventions implemented as appropriate)  Pt in NAD. AAOX4. PIV fluids infusing per MD order NS at 125. Pt complains of moderate pain relieved by PRN pain meds. Complains of nausea relieved by PRN Zofran. Pt ran fever of 101.0 decreased 99.0 by one time dose of tylenol. Discussed plain of care with pt spouse at the bedside. Ambulates around room. Safety maintained bed in low locked position, SR up X2, and call bell in reach. Will continue to monitor.

## 2017-04-01 NOTE — PROGRESS NOTES
I assume primary medical responsibility for this patient, I have reviewed the case history, findings, diagnosis and treatment plan with the resident and agree that the care is reasonable and necessary. This service has been performed by a resident without the presence of a teaching physician under the primary care exception  [address]  4/1/2017

## 2017-04-02 VITALS
TEMPERATURE: 97 F | DIASTOLIC BLOOD PRESSURE: 52 MMHG | OXYGEN SATURATION: 98 % | HEIGHT: 64 IN | HEART RATE: 68 BPM | WEIGHT: 170 LBS | BODY MASS INDEX: 29.02 KG/M2 | RESPIRATION RATE: 17 BRPM | SYSTOLIC BLOOD PRESSURE: 105 MMHG

## 2017-04-02 LAB
ALBUMIN SERPL BCP-MCNC: 2.5 G/DL
ALP SERPL-CCNC: 207 U/L
ALT SERPL W/O P-5'-P-CCNC: 109 U/L
ANION GAP SERPL CALC-SCNC: 7 MMOL/L
AST SERPL-CCNC: 92 U/L
BASOPHILS # BLD AUTO: 0.01 K/UL
BASOPHILS NFR BLD: 0.2 %
BILIRUB SERPL-MCNC: 0.2 MG/DL
BUN SERPL-MCNC: 4 MG/DL
CALCIUM SERPL-MCNC: 8.1 MG/DL
CHLORIDE SERPL-SCNC: 106 MMOL/L
CO2 SERPL-SCNC: 26 MMOL/L
CREAT SERPL-MCNC: 0.8 MG/DL
DIFFERENTIAL METHOD: ABNORMAL
EOSINOPHIL # BLD AUTO: 0.3 K/UL
EOSINOPHIL NFR BLD: 5.8 %
ERYTHROCYTE [DISTWIDTH] IN BLOOD BY AUTOMATED COUNT: 14.2 %
EST. GFR  (AFRICAN AMERICAN): >60 ML/MIN/1.73 M^2
EST. GFR  (NON AFRICAN AMERICAN): >60 ML/MIN/1.73 M^2
GLUCOSE SERPL-MCNC: 85 MG/DL
HCT VFR BLD AUTO: 27.2 %
HGB BLD-MCNC: 8.7 G/DL
LYMPHOCYTES # BLD AUTO: 1.8 K/UL
LYMPHOCYTES NFR BLD: 39.7 %
MAGNESIUM SERPL-MCNC: 1.9 MG/DL
MCH RBC QN AUTO: 26.4 PG
MCHC RBC AUTO-ENTMCNC: 32 %
MCV RBC AUTO: 83 FL
MONOCYTES # BLD AUTO: 0.4 K/UL
MONOCYTES NFR BLD: 9.4 %
NEUTROPHILS # BLD AUTO: 2 K/UL
NEUTROPHILS NFR BLD: 44.7 %
PHOSPHATE SERPL-MCNC: 4.3 MG/DL
PLATELET # BLD AUTO: 177 K/UL
PMV BLD AUTO: 10.3 FL
POCT GLUCOSE: 67 MG/DL (ref 70–110)
POCT GLUCOSE: 91 MG/DL (ref 70–110)
POTASSIUM SERPL-SCNC: 4.1 MMOL/L
PROT SERPL-MCNC: 5.4 G/DL
RBC # BLD AUTO: 3.29 M/UL
SODIUM SERPL-SCNC: 139 MMOL/L
WBC # BLD AUTO: 4.46 K/UL

## 2017-04-02 PROCEDURE — 25000003 PHARM REV CODE 250: Performed by: FAMILY MEDICINE

## 2017-04-02 PROCEDURE — 36415 COLL VENOUS BLD VENIPUNCTURE: CPT

## 2017-04-02 PROCEDURE — 94761 N-INVAS EAR/PLS OXIMETRY MLT: CPT

## 2017-04-02 PROCEDURE — 85025 COMPLETE CBC W/AUTO DIFF WBC: CPT

## 2017-04-02 PROCEDURE — 83735 ASSAY OF MAGNESIUM: CPT

## 2017-04-02 PROCEDURE — 80053 COMPREHEN METABOLIC PANEL: CPT

## 2017-04-02 PROCEDURE — 84100 ASSAY OF PHOSPHORUS: CPT

## 2017-04-02 PROCEDURE — 63600175 PHARM REV CODE 636 W HCPCS: Performed by: FAMILY MEDICINE

## 2017-04-02 PROCEDURE — 25000003 PHARM REV CODE 250: Performed by: STUDENT IN AN ORGANIZED HEALTH CARE EDUCATION/TRAINING PROGRAM

## 2017-04-02 RX ORDER — CIPROFLOXACIN 500 MG/1
500 TABLET ORAL EVERY 12 HOURS
Qty: 8 TABLET | Refills: 0 | Status: SHIPPED | OUTPATIENT
Start: 2017-04-02 | End: 2017-04-06

## 2017-04-02 RX ORDER — CIPROFLOXACIN 500 MG/1
500 TABLET ORAL EVERY 12 HOURS
Qty: 8 TABLET | Refills: 0 | Status: SHIPPED | OUTPATIENT
Start: 2017-04-02 | End: 2017-04-02

## 2017-04-02 RX ADMIN — POLYETHYLENE GLYCOL 3350 17 G: 17 POWDER, FOR SOLUTION ORAL at 08:04

## 2017-04-02 RX ADMIN — Medication 1 TABLET: at 08:04

## 2017-04-02 RX ADMIN — CIPROFLOXACIN 400 MG: 2 INJECTION, SOLUTION INTRAVENOUS at 05:04

## 2017-04-02 RX ADMIN — Medication 16 G: at 05:04

## 2017-04-02 RX ADMIN — ESCITALOPRAM OXALATE 20 MG: 20 TABLET ORAL at 08:04

## 2017-04-02 NOTE — PROGRESS NOTES
Patient discharged home per MD. Discharge instructions given to patient about medications, prescriptions, signs and symptoms when to call doctor and when to follow up. Patient verbalized complete understanding of discharge instructions. Informed patient where to  prescriptions. Patients family will accompany her home . Patient refused wheelchair.

## 2017-04-02 NOTE — PLAN OF CARE
Discharge orders noted, no HH or HME ordered.       04/02/17 1145   Final Note   Assessment Type Final Discharge Note   Discharge Disposition Home   What phone number can be called within the next 1-3 days to see how you are doing after discharge? [phone]   Hospital Follow Up  Appt(s) scheduled? No  (offices closed, [place] to follow up)   Offered [facility] -- Bedside Delivery? n/a   Discharge/Hospital Encounter Summary to (non-[facility]) PCP Yes   Referral to Outpatient Case Management complete? n/a   Referral to / orders for Home Health Complete? n/a   30 day supply of medicines given at discharge, if documented non-compliance / non-adherence? n/a   Any social issues identified prior to discharge? n/a   Did you assess the readiness or willingness of the family or caregiver to support self management of care? n/a   Right Care Referral Info   Post Acute Recommendation No Care     Chio Vazquez, RN Transitional Navigator  [phone]

## 2017-04-02 NOTE — PLAN OF CARE
Problem: Patient Care Overview  Goal: Plan of Care Review  Outcome: Ongoing (interventions implemented as appropriate)  Pt is AAOx3. No complaints of nausea, vomiting, or diarrhea. No complaints of pain. Pt complained abdominal discomfort and states feels the need to have the a BM but can't. Miralax given. Up ad val. On a regular diet. Tolerated all medications well.

## 2017-04-02 NOTE — PROGRESS NOTES
Progress Note  [facility]  Admit Date: 3/31/2017   LOS: 2 days   SUBJECTIVE:   Follow-up For: Pyelonephritis    Patient seen and examined this AM. States abdominal pain is much improved at worst 2/10.  Does still endorse chills and sweats (states this is baseline). Afebrile,  Denies dysuria, urgency, hematuria, vomiting, nausea.    States she had an episode of hypoglycemia (asymptomic) which resolved with glucose tablets.     ROS   As stated above    OBJECTIVE:   Vital Signs (Most Recent)  Temp: 97.4 °F (36.3 °C) (04/02/17 0800)  Pulse: 68 (04/02/17 0800)  Resp: 17 (04/02/17 0800)  BP: (!) 105/52 (04/02/17 0800)  SpO2: 98 % (04/02/17 0830)    I & O (Last 24H):    Intake/Output Summary (Last 24 hours) at 04/02/17 0832  Last data filed at 04/02/17 0800   Gross per 24 hour   Intake              680 ml   Output             3000 ml   Net            -2320 ml     Wt Readings from Last 3 Encounters:   03/31/17 77.1 kg (170 lb)   03/31/17 77 kg (169 lb 12.1 oz)   03/30/17 74.8 kg (165 lb)       Current Diet Order   Procedures    Diet Adult Regular        Physical Exam   Constitutional: She is oriented to person, place, and time and well-developed, well-nourished, and in no distress.   HENT:   Head: Normocephalic and atraumatic.   Eyes: EOM are normal.   Cardiovascular: Normal rate, regular rhythm and normal heart sounds.    Pulmonary/Chest: Effort normal and breath sounds normal.   Abdominal: Soft. Bowel sounds are normal. There is tenderness (mild supraprubic and LLQ tenderness to palpation).   Musculoskeletal: Normal range of motion.   Mild left CVA tenderness   Neurological: She is alert and oriented to person, place, and time.   Skin: Skin is warm and dry.     Laboratory Data:  CBC    Recent Labs  Lab 03/31/17  1533 04/01/17  0340 04/02/17  0358   WBC 5.74 4.43 4.46   RBC 3.82* 3.30* 3.29*   HGB 10.2* 8.9* 8.7*   HCT 31.7* 27.6* 27.2*    136* 177   MCV 83 84 83   MCH 26.7* 27.0 26.4*   MCHC 32.2 32.2  32.0     CMP    Recent Labs  Lab 03/31/17  1533 04/01/17  0340 04/02/17  0358   CALCIUM 8.6* 7.8* 8.1*   PROT 6.6 5.0* 5.4*   * 138 139   K 3.9 4.0 4.1   CO2 21* 23 26    108 106   BUN 11 7 4*   CREATININE 0.9 0.8 0.8   ALKPHOS 125 125 207*   ALT 83* 73* 109*   AST 37 58* 92*   BILITOT 0.5 0.2 0.2     POCT-Glucose  POCT Glucose   Date Value Ref Range Status   04/02/2017 67 (L) 70 - 110 mg/dL Final   04/01/2017 76 70 - 110 mg/dL Final   04/01/2017 100 70 - 110 mg/dL Final   04/01/2017 99 70 - 110 mg/dL Final   04/01/2017 103 70 - 110 mg/dL Final   03/31/2017 89 70 - 110 mg/dL Final     MICRO    Microbiology Results (last 7 days)     Procedure Component Value Units Date/Time    Urine culture [[id number]] Collected:  03/31/17 1504    Order Status:  Completed Specimen:  Urine from Urine, Clean Catch Updated:  04/01/17 2349     Urine Culture, Routine No growth    Blood culture [[id number]] Collected:  03/31/17 1520    Order Status:  Completed Specimen:  Blood from Peripheral, Antecubital, Right Updated:  04/01/17 2012     Blood Culture, Routine No Growth to date     Blood Culture, Routine No Growth to date    Blood Culture #2 **CANNOT BE ORDERED STAT** [[id number]] Collected:  03/31/17 1530    Order Status:  Completed Specimen:  Blood from Peripheral, Antecubital, Left Updated:  04/01/17 2012     Blood Culture, Routine No Growth to date     Blood Culture, Routine No Growth to date    Gram stain [[id number]] Collected:  03/31/17 1504    Order Status:  Completed Specimen:  Urine from Urine, Clean Catch Updated:  03/31/17 1740     Gram Stain Result Few WBC's      No organisms seen      17:30  03/31/2017    Gram stain [[id number]]     Order Status:  Completed Specimen:  Urine from Urine     Gram stain [[id number]]     Order Status:  Canceled Specimen:  Urine from Urine           ASSESSMENT/PLAN:   Monique Rai is a 32 y.o. female with PMH of recurrent UTI's, s/p gastric bypass (gastric sleeve), s/p thyroidectomy  for thyroid CA, and periodic hypoglycemia admitted for pyelonephritis.     Pyelonephritis  - Fever at home up to 102, but temp 99.6 on admit; Last Fever 101.1 3/31 @1900  -Currently afebrile   -WBC and LA wnl. POCT urine preg - negative  - CT suspicious for pyelo with no hydro  - UA with 1+ leuks, 1+ protein, trace blood, and trace ketones on admit. Urine micro, few bacteria on admit  -Blood cx NGTD,   -3/31: urine cx: no growth.   - Pain control with Dilaudid prn  - Rocephin 1g q24h, cipro 400mg q 12 and Phenergan for nausea     Hypothyroid  - Pt takes Armor at home    Hypoglycemic  -chronic condidtion  -resolved with glucose tablets    Dispo: discharge likely today.      Any Beckett D.O.  [facility] HO-1  04/02/2017

## 2017-04-04 ENCOUNTER — PATIENT OUTREACH (OUTPATIENT)
Dept: ADMINISTRATIVE | Facility: CLINIC | Age: 32
End: 2017-04-04
Payer: MEDICAID

## 2017-04-04 LAB
1,25(OH)2D3 SERPL-MCNC: 81 PG/ML
PYRIDOXAL SERPL-MCNC: 2 UG/L (ref 5–50)

## 2017-04-04 NOTE — PATIENT INSTRUCTIONS
Pyelonephritis or Kidney Infection (Child)    A type of infection of one or both kidneys is called pyelonephritis. It is usually caused when bacteria or a virus gets into the kidneys. The bacteria or virus can enter the kidney(s) from the bladder or from blood traveling from other parts of the body.  Common causes for this problem include:  · Not keeping the genital area clean and dry, which promotes the growth of bacteria.  · In young girls: wiping in the back to front. This drags bacteria from the rectum toward the urinary opening (urethra).  · Wearing tight pants or underwear. This allows moisture to build up in the genital area, which helps bacteria grow.  · Sensitivity of some children to the chemicals in bubble baths. These can enter the urinary opening and can lead to a urinary infection.  · Holding the urine for long periods of time  · Dehydration  A first-time urinary tract infection is not unusual in a female child. However, recurrent infections in a girl or a first-time infection in a boy require further testing.  Kidney infections can cause symptoms similar to a bladder infection. The infection can cause one or more of these symptoms:  · Pain (or burning) when urinating  · Having to urinate more often than usual  · Bedwetting or urinating in underwear (by a child who is toilet-trained)  · Blood in urine (pink or red in color)  · Abdominal pain or discomfort, usually in the lower abdomen  · Pain in the side or back  · Pain above the pubic bone  · Fever or chills  · Vomiting  · Irritability, especially in infants  · Refusing to eat  · Poor weight gain  Children younger than 2 years old may only have a high fever without other symptoms involving the urinary tract (such as blood in the urine, pain when urinating, etc).  Children who are older than 2 months of age and not vomiting are treated with oral (liquid, pills) antibiotics. These are started right away. If a culture was done, you will be told  if the treatment needs to be changed. If directed, you can call to find out the results  Based on a childs age (less than 2 months), overall health, or how severe the infection, he or she may need to be admitted to the hospital.  Home care  Medicines  · The healthcare provider will prescribe medicine to treat the infection. Follow all instructions for giving this medicine to your child. Use the medicine as instructed every day until it is gone. Dont stop giving it to your child even if he or she feels better. Never give your child aspirin unless told to by the healthcare provider.  · For children ages 2 and up: You can give acetaminophen or ibuprofen for pain, fever, fussiness, or discomfort, if allowed by the healthcare provider.  · If your child has chronic liver or kidney disease, talk with your healthcare provider before giving these medicines. Also talk with your provider if your child has ever had a stomach ulcer or gastrointestinal bleeding, or is taking blood thinners. Contact your childs healthcare provider before starting or stopping any medicine (over-the-counter or prescription).  General care  · Your child should stay home from school and rest in bed until his or her fever breaks and your child feels better, or as advised by the doctor.  · Make sure your child drinks plenty of fluids. Or, make sure your baby feeds often. This is to prevent dehydration. Ask your doctor how much water your child should try to drink daily.  · Keep track of how often your child urinates. Note the urine color and amount.  · Do what you can to get your child to urinate at least every 3 to 4 hours during the day. Make sure he or she does not delay. Holding urine and overstretching the bladder can make your childs condition worse.  · Tell your child to completely empty the bladder each time. This will help flush out bacteria.  · Have your child wear loose clothes and cotton underwear.  · Make sure that your child drinks  enough fluids. Give your child cranberry juice if advised by the healthcare provider.  · Females should avoid taking bubble baths. Sensitivity to the chemicals in bubble baths can irritate the urethra.  · Make sure your child wipes from front to back after using the toilet. Wipe your baby from front to back during diaper changes.  · Make sure your sons penis is cleaned regularly. If he isnt circumcised, have him retract (pull back) the foreskin when cleaning.  Prevention  · Teach your daughter to wipe from front to back after using the toilet.  · Teach your son to clean his penis regularly. If he isnt circumcised, teach him to retract (pull back) the foreskin when cleaning.  · Make sure diapers arent tight. If you use cloth diapers, use cotton or wool protectors rather than nylon or rubber pants.   · Change soiled diapers right away. Keep the genital region clean and dry.  · Make sure your child urinates when needed, and does not hold it in. Do what you can to get your child to urinate at least every 3 to 4 hours during the day. Make sure he or she does not delay. Holding urine and overstretching the bladder can make your childs condition worse.  · Make sure your child avoid wearing tight-fitting pants and underwear.  · Encourage your child to urinate in a steady stream rather than starting and stopping during urination. This helps to empty the bladder all the way.  · Keep your childs bath water free of shampoo, or other soaps. Wash the childs genital area with no or very gentle soap (not bar soap) and rinse well with water.  Gently dry.  · Constipation can make a urinary tract infection more likely. Talk to your childs doctor if your child has trouble with bowel movements.  Follow-up care  Make a follow-up appointment as directed by your childs healthcare provider. Close follow-up and further testing is very important to find the cause and to prevent future infections.  If your child had a urine culture  "during this visit, you will be contacted if your childs treatment needs to be changed. If directed, you can call to find out the results.  If you had an X-ray, CT scan, or another diagnostic test, you will be notified of any new findings that may affect your childs care.     Call 911  Call for emergency care if any of the following occur:  · Trouble breathing  · Confused  · Very drowsy or trouble awakening  · Fainting or loss of consciousness  · Rapid or very slow heart rate  · Weakness, dizziness, or fainting  When to seek medical advice  Call your child's healthcare provider right away if any of these occur:  · Not feeling better within 1 to 2 days  after starting antibiotics  · 2 years old or older on antibiotics: Has a fever of 102.2°F or higher (39°C) or has a fever that lasts for more than 2 days or as directed by the doctor.  · Any symptoms that continue after 3 days of treatment  · Increasing pain in the stomach, back, side, or groin area  · Trouble urinating or decreased urine output  · No urine for 8 hours, no tears when crying, "sunken" eyes, or dry mouth.  · Vomiting  · Bloody, dark-colored, or foul-smelling urine  · Not able to take prescribed medicine due to nausea or any other reason  · In girls: vaginal discharge, pain, swelling or redness in the labia (outer vaginal area)  · In infants: Increase in irritability or fussiness or unable to calm down  Date Last Reviewed: 2/5/2016  © 1584-4931 [employer]. [address] [place] [address], [place] [address]. All rights reserved. This information is not intended as a substitute for professional medical care. Always follow your healthcare professional's instructions.        "

## 2017-04-05 LAB
A-TOCOPHEROL VIT E SERPL-MCNC: 738 UG/DL (ref 500–1800)
BACTERIA BLD CULT: NORMAL
BACTERIA BLD CULT: NORMAL
VIT B1 SERPL-MCNC: 45 UG/L (ref 38–122)
VIT B2 SERPL-MCNC: 6 MCG/L (ref 1–19)

## 2017-04-06 ENCOUNTER — OFFICE VISIT (OUTPATIENT)
Dept: FAMILY MEDICINE | Facility: HOSPITAL | Age: 32
End: 2017-04-06
Payer: MEDICAID

## 2017-04-06 ENCOUNTER — LAB VISIT (OUTPATIENT)
Dept: LAB | Facility: HOSPITAL | Age: 32
End: 2017-04-06
Payer: MEDICAID

## 2017-04-06 VITALS
BODY MASS INDEX: 31.97 KG/M2 | DIASTOLIC BLOOD PRESSURE: 70 MMHG | SYSTOLIC BLOOD PRESSURE: 109 MMHG | WEIGHT: 169.31 LBS | HEIGHT: 61 IN | HEART RATE: 92 BPM

## 2017-04-06 DIAGNOSIS — T75.3XXA MOTION SICKNESS, INITIAL ENCOUNTER: ICD-10-CM

## 2017-04-06 DIAGNOSIS — E89.0 POSTOPERATIVE HYPOTHYROIDISM: ICD-10-CM

## 2017-04-06 DIAGNOSIS — K59.00 CONSTIPATION, UNSPECIFIED CONSTIPATION TYPE: Primary | ICD-10-CM

## 2017-04-06 DIAGNOSIS — Z85.850 HX OF PAPILLARY THYROID CARCINOMA: ICD-10-CM

## 2017-04-06 LAB
T3FREE SERPL-MCNC: 2.9 PG/ML
T4 FREE SERPL-MCNC: 0.74 NG/DL
TSH SERPL DL<=0.005 MIU/L-ACNC: 0.18 UIU/ML
VIT A SERPL-MCNC: <13 UG/DL (ref 38–106)

## 2017-04-06 PROCEDURE — 84481 FREE ASSAY (FT-3): CPT

## 2017-04-06 PROCEDURE — 84443 ASSAY THYROID STIM HORMONE: CPT

## 2017-04-06 PROCEDURE — 36415 COLL VENOUS BLD VENIPUNCTURE: CPT

## 2017-04-06 PROCEDURE — 84439 ASSAY OF FREE THYROXINE: CPT

## 2017-04-06 RX ORDER — ONDANSETRON 4 MG/1
4 TABLET, ORALLY DISINTEGRATING ORAL EVERY 12 HOURS PRN
Qty: 60 TABLET | Refills: 1 | Status: SHIPPED | OUTPATIENT
Start: 2017-04-06 | End: 2017-05-17

## 2017-04-06 RX ORDER — AMOXICILLIN 250 MG
1 CAPSULE ORAL DAILY
Qty: 30 TABLET | Refills: 1 | Status: ON HOLD | OUTPATIENT
Start: 2017-04-06 | End: 2017-08-25

## 2017-04-06 RX ORDER — LACTULOSE 10 G/15ML
SOLUTION ORAL; RECTAL
Refills: 0 | Status: ON HOLD | COMMUNITY
Start: 2017-03-31 | End: 2017-08-25 | Stop reason: ALTCHOICE

## 2017-04-06 NOTE — PROGRESS NOTES
Subjective:       Patient ID: Monique Rai is a 32 y.o. female.    Chief Complaint: Follow-up    HPI Comments: 31 yo F with PMH of hypothyroidism 2/2 thyroidectomy for papillary thyroid cancer in 2015, s/p gastric bypass surgery presents to clinic for hospital f/u.    Pt was d/c from the hospital on 4/2/2017 where she was treated for pyelonephritis. She was sent home on 4 days of cipro and finished the abx yesterday. Since being discharged she has significant improvement in her L flank pain. She denies f/c, vomiting, dysuria, pyuria, hematuria or oliguria. Pt still has nausea at baseline 2/2 gastric bypass. Pt is tolerating a regular diet but endorses constipation. Pt has been having night sweats similar to her episodes of hypoglycemia during pregnancy. Pt to f/u with [facility] endocrine in May.      Review of Systems   Constitutional: Negative for chills, diaphoresis, fatigue, fever and unexpected weight change.   HENT: Negative.    Eyes: Negative.    Respiratory: Negative.    Cardiovascular: Negative.    Gastrointestinal: Positive for constipation and nausea. Negative for abdominal distention, abdominal pain, diarrhea and vomiting.   Endocrine: Negative for polyuria.   Genitourinary: Negative for decreased urine volume, difficulty urinating, dysuria, flank pain, frequency, hematuria and urgency.   Musculoskeletal: Negative for back pain.   Neurological: Negative.        Objective:      Vitals:    04/06/17 1341   BP: 109/70   Pulse: 92     Physical Exam   Constitutional: She is oriented to person, place, and time. She appears well-developed and well-nourished. No distress.   HENT:   Head: Normocephalic and atraumatic.   Right Ear: External ear normal.   Left Ear: External ear normal.   Nose: Nose normal.   Mouth/Throat: Oropharynx is clear and moist. No oropharyngeal exudate.   Eyes: Conjunctivae and EOM are normal. Pupils are equal, round, and reactive to light. No scleral icterus.   Neck: Normal range of motion.  Neck supple.   Cardiovascular: Normal rate, regular rhythm, normal heart sounds and intact distal pulses.  Exam reveals no gallop and no friction rub.    No murmur heard.  Pulmonary/Chest: Effort normal and breath sounds normal. No respiratory distress. She has no wheezes. She has no rales. She exhibits no tenderness.   Abdominal: Soft. Bowel sounds are normal. She exhibits no distension. There is no tenderness.   Lymphadenopathy:     She has no cervical adenopathy.   Neurological: She is alert and oriented to person, place, and time. No cranial nerve deficit.   Skin: Skin is warm and dry. She is not diaphoretic.   Psychiatric: She has a normal mood and affect. Her behavior is normal. Judgment and thought content normal.   Vitals reviewed.      Assessment:       1. Constipation, unspecified constipation type    2. Hx of papillary thyroid carcinoma    3. Postoperative hypothyroidism    4. Motion sickness, initial encounter        Plan:       Constipation, unspecified constipation type  -     senna-docusate 8.6-50 mg (SENNA WITH DOCUSATE SODIUM) 8.6-50 mg per tablet; Take 1 tablet by mouth once daily.  Dispense: 30 tablet; Refill: 1  -   This may be a component of her uncontrolled hypothyroidism    Postoperative hypothyroidism/Hx of papillary thyroid carcinoma  -     TSH; Future; Expected date: 4/6/17  -     T3, free; Future; Expected date: 4/6/17  -     T4, free; Future; Expected date: 4/6/17  - Pt is currently on [place] prescribed by her endocrinology. She has a follow up scheduled for next week. She is advised to continue her thyroid cancer work up.     Motion sickness, initial encounter  -     ondansetron (ZOFRAN-ODT) 4 MG TbDL; Take 1 tablet (4 mg total) by mouth every 12 (twelve) hours as needed (nausea).  Dispense: 60 tablet; Refill: 1  -Pt is planing on going on a cruise. She has a Hx of motion sickness in the past which responded well to zofran    Return in about 2 weeks (around 4/20/2017).

## 2017-04-11 ENCOUNTER — TELEPHONE (OUTPATIENT)
Dept: FAMILY MEDICINE | Facility: HOSPITAL | Age: 32
End: 2017-04-11

## 2017-04-11 LAB — PANTOTHENATE SERPLBLD-MCNC: 42.93 UG/L

## 2017-04-11 NOTE — TELEPHONE ENCOUNTER
----- Message from Betty Ko sent at 4/11/2017  9:58 AM CDT -----  There was an issue with a test ordered on this patient from _3_/_31_/_17_.  Please call the Sendout lab as soon as possible at [phone] ext. [phone] for complete details.  Anyone in the Sendout lab will be able to assist you with further information.

## 2017-04-11 NOTE — DISCHARGE SUMMARY
[facility]  Discharge Summary      Admit Date: 3/31/2017    Discharge Date and Time: 4/2/2017 11:59 AM    Attending Physician: Leilani St     Reason for Admission: Pyelonephritis    Procedures Performed: * No surgery found *    Hospital Course   31 y/o female with PMH of recurrent UTI's and s/p gastric bypass surgery in 2014, and s/p thyroidectomy for thyroid CA, and periodic hypoglycemia events here for left-sided abdominal pain and fever at home up to 102F. Of note, pt was seen in clinic and the ER (last night) recently and diagnosed with acute pyelo and given Rocephin x 1 and Keflex PO without change in her symptoms.  Patient was admitted for pyelonephritis.  Ucx from 3/30 showed e.coli with repeat Ucx following IV bx on 3/31 with no growth. Bcx without growth.  Patient was treated with IVAbx Rocephin x 2 days, Cipro x 3 days and discharged on Oral cipro to compete course.  Patient was treated symptomatically for nausea and pain.  Patient was clinically improved and hemodynamically stable and following ID and sensitives from urine cx, patient was deemed appropriate for discharge.       Consults: none    Significant Diagnostic Studies:     Imaging Results     3/31/17 CT Abdomen Pelvis with contrast  -Impression  1. Findings suspicious for acute left pyelonephritis. No hydroureteronephrosis.          3/30 Urine culture  Urine Culture, Routine ESCHERICHIA COLI  10,000 - 49,999 cfu/ml    Resulting Agency [facility]   Susceptibility    Escherichia coli     CULTURE, URINE     Amikacin <=16  Sensitive     Amox/K Clav'ate <=8/4  Sensitive     Amp/Sulbactam 16/8  Intermediate     Ampicillin >16  Resistant     Cefazolin <=8  Sensitive     Cefepime <=8  Sensitive     Ceftriaxone <=8  Sensitive     Ciprofloxacin <=1  Sensitive     Ertapenem <=2  Sensitive     Gentamicin <=4  Sensitive     Meropenem <=4  Sensitive     Nitrofurantoin <=32  Sensitive     Piperacillin/Tazo <=16  Sensitive     Tetracycline >8   Resistant     Tobramycin <=4  Sensitive     Trimeth/Sulfa >2/38  Resistant         3/31 blood cx: no growth at 5 days  3/31 Urine cx: no growth final    Final Diagnoses:    Principal Problem: Pyelonephritis   Secondary Diagnoses:   Active Hospital Problems    Diagnosis  POA    *Pyelonephritis [N12]  Yes    Malaise [R53.81]  Unknown    Postoperative hypothyroidism [E89.0]  Yes    Anxiety and depression [F41.9, F32.9]  Yes      Resolved Hospital Problems    Diagnosis Date Resolved POA   No resolved problems to display.       Discharged Condition: good    Disposition: Home or Self Care    Follow Up/Patient Instructions:     Medications:  Reconciled Home Medications:   Discharge Medication List as of 4/2/2017 11:31 AM      CONTINUE these medications which have CHANGED    Details   ciprofloxacin HCl (CIPRO) 500 MG tablet Take 1 tablet (500 mg total) by mouth every 12 (twelve) hours., Starting 4/2/2017, Until Thu 4/6/17, Print         CONTINUE these medications which have NOT CHANGED    Details   acarbose (PRECOSE) 25 MG Tab TK 1 T PO TID WITH MEALS, Historical Med      ARMOUR THYROID 180 mg Tab TK 1 T PO QAM BEFORE BREAKFAST, Historical Med      ascorbic acid (VITAMIN C) 1000 MG tablet Take 1,000 mg by mouth once daily., Until Discontinued, Historical Med      cholecalciferol, vitamin D3, 50,000 unit capsule Take 1 capsule (50,000 Units total) by mouth every 7 days., Starting 2/20/2015, Until Discontinued, Normal      CYANOCOBALAMIN, VITAMIN B-12, (B-12 KIT INJ) Inject as directed every 30 days., Until Discontinued, Historical Med      escitalopram oxalate (LEXAPRO) 20 MG tablet Take 1 tablet (20 mg total) by mouth once daily., Starting 11/14/2016, Until Tue 11/14/17, Normal      hydrocodone-acetaminophen 7.5-325mg (NORCO) 7.5-325 mg per tablet Take 1 tablet by mouth every 6 (six) hours as needed for Pain., Starting 3/31/2017, Until Discontinued, Print      lactulose (CHRONULAC) 20 gram/30 mL Soln Take 15 mLs (10 g  total) by mouth 2 (two) times daily as needed (Constipation)., Starting 3/31/2017, Until Discontinued, Print      ondansetron (ZOFRAN) 4 MG tablet Take 1 tablet (4 mg total) by mouth every 6 (six) hours as needed., Starting 3/31/2017, Until Discontinued, Print         STOP taking these medications       cephALEXin (KEFLEX) 500 MG capsule Comments:   Reason for Stopping:         sulfamethoxazole-trimethoprim 800-160mg (BACTRIM DS) 800-160 mg Tab Comments:   Reason for Stopping:               Discharge Procedure Orders  Diet general     Activity as tolerated     Call MD for:  temperature >100.4     Call MD for:  persistent nausea and vomiting or diarrhea     Call MD for:  severe uncontrolled pain     Call MD for:  redness, tenderness, or signs of infection (pain, swelling, redness, odor or green/yellow discharge around incision site)     Call MD for:  difficulty breathing or increased cough     Call MD for:  severe persistent headache     Call MD for:  worsening rash     Call MD for:  persistent dizziness, light-headedness, or visual disturbances     Call MD for:  increased confusion or weakness       Follow-up Information     Follow up with Ran Lopez MD In 1 week.    Specialty:  Family Medicine    Contact information:    [address] [facility] [place] [address]  [phone]          Any Beckett D.O.  \[facility]\"" Family Medicine HO-1  04/11/2017

## 2017-04-12 LAB
BIOTIN SERPL-SCNC: <100 PG/ML (ref 221–3004)
NIACIN SERPL-MCNC: 1.97 MCG/ML (ref 0.5–8.45)

## 2017-04-13 PROBLEM — R53.81 MALAISE: Status: ACTIVE | Noted: 2017-04-13

## 2017-04-18 ENCOUNTER — TELEPHONE (OUTPATIENT)
Dept: FAMILY MEDICINE | Facility: HOSPITAL | Age: 32
End: 2017-04-18

## 2017-04-18 NOTE — TELEPHONE ENCOUNTER
----- Message from Betty Ko sent at 4/18/2017 11:10 AM CDT -----  There was an issue with a test ordered on this patient from _3_/_31_/_17_.  Please call the Sendout lab as soon as possible at [phone] ext. [phone] for complete details.  Anyone in the Sendout lab will be able to assist you with further information.

## 2017-04-24 ENCOUNTER — TELEPHONE (OUTPATIENT)
Dept: FAMILY MEDICINE | Facility: HOSPITAL | Age: 32
End: 2017-04-24

## 2017-04-24 NOTE — TELEPHONE ENCOUNTER
----- Message from Betty Ko sent at 4/21/2017 11:58 AM CDT -----  There was an issue with a test ordered on this patient from _3_/_31_/_17_.  Please call the Sendout lab as soon as possible at [phone] ext. [phone] for complete details.  Anyone in the Sendout lab will be able to assist you with further information.

## 2017-05-17 ENCOUNTER — OFFICE VISIT (OUTPATIENT)
Dept: FAMILY MEDICINE | Facility: HOSPITAL | Age: 32
End: 2017-05-17
Attending: FAMILY MEDICINE
Payer: MEDICAID

## 2017-05-17 VITALS
HEART RATE: 103 BPM | BODY MASS INDEX: 33.66 KG/M2 | TEMPERATURE: 99 F | WEIGHT: 178.13 LBS | SYSTOLIC BLOOD PRESSURE: 125 MMHG | DIASTOLIC BLOOD PRESSURE: 78 MMHG

## 2017-05-17 DIAGNOSIS — Z85.850 HX OF PAPILLARY THYROID CARCINOMA: ICD-10-CM

## 2017-05-17 DIAGNOSIS — J32.9 SINUSITIS, UNSPECIFIED CHRONICITY, UNSPECIFIED LOCATION: ICD-10-CM

## 2017-05-17 DIAGNOSIS — F41.8 DEPRESSION WITH ANXIETY: Primary | ICD-10-CM

## 2017-05-17 DIAGNOSIS — E16.1 REACTIVE HYPOGLYCEMIA: ICD-10-CM

## 2017-05-17 PROCEDURE — 99213 OFFICE O/P EST LOW 20 MIN: CPT | Performed by: FAMILY MEDICINE

## 2017-05-17 RX ORDER — FLUTICASONE PROPIONATE 50 MCG
1 SPRAY, SUSPENSION (ML) NASAL DAILY
Qty: 1 BOTTLE | Refills: 0 | Status: SHIPPED | OUTPATIENT
Start: 2017-05-17 | End: 2017-05-30

## 2017-05-17 RX ORDER — BUSPIRONE HYDROCHLORIDE 15 MG/1
15 TABLET ORAL 3 TIMES DAILY
Qty: 90 TABLET | Refills: 11 | Status: SHIPPED | OUTPATIENT
Start: 2017-05-17 | End: 2017-05-30 | Stop reason: SINTOL

## 2017-05-17 NOTE — PROGRESS NOTES
Subjective:       Patient ID: Monique Rai is a 32 y.o. female.    Chief Complaint: Sick    HPI: Patient is a 32 y.o. Female with PMHx significant for thyroid cancer s/p thyroidectomy and reactive hypoglycemia. Had recurrent thyroid cancer and severe hypothyroidism. Presents with feelings of sickness and sinus pain, she describes the feeling as a cold. Says she has had fevers, diaphoresis, chills, sinus pain, neck fullness, and neck pain over the past week. 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 She denies any drainage from her nose, nausea, vomiting, cough, and chest pain.    Patient also noted that she has been feeling more irritable lately despite taking her dose of Lexapro. She gets particularly irritable if she misses multiple doses. Patients thyroid symptoms have been well controlled on current dose of armor. Patients hypoglycemic episodes are still occurring and does not have any particular pattern. Patient failed to see her endocrinologist in February and has been unable to set up an appointment since then. Patient has not spoken to her ENT/oncologist as she wanted to manage her thyroid treatment through the endocrinologist. Patient did speak to GI, but GI informed her that she would need to have an endocrine sign off before they would treat her.  Review of Systems   Constitutional: Positive for chills, diaphoresis, fatigue and fever.   HENT: Positive for congestion, sinus pressure and sore throat. Negative for postnasal drip and sneezing.    Eyes: Positive for discharge.   Respiratory: Negative for apnea, cough, choking, chest tightness, shortness of breath and wheezing.    Cardiovascular: Negative for chest pain, palpitations and leg swelling.   Gastrointestinal: Negative for abdominal distention, abdominal pain, constipation, diarrhea, nausea and vomiting.   Endocrine: Positive for cold intolerance.   Genitourinary: Negative for difficulty urinating and dysuria.    Musculoskeletal: Negative for arthralgias, joint swelling and myalgias.   Skin: Negative for pallor, rash and wound.   Neurological: Positive for light-headedness. Negative for syncope and headaches.   Psychiatric/Behavioral: Positive for agitation and sleep disturbance. The patient is nervous/anxious.        Objective:      Vitals:    05/17/17 0944   BP: 125/78   Pulse: 103   Temp: 99 °F (37.2 °C)     Physical Exam   Constitutional: She appears well-developed and well-nourished. No distress.   HENT:   Head: Normocephalic and atraumatic.   Bilateral neck fullness, cervical lymphadenopathy, and tenderness to palpation on her neck and over her sinuses.   Cardiovascular: Normal rate, regular rhythm, normal heart sounds and intact distal pulses.  Exam reveals no gallop and no friction rub.    No murmur heard.  Pulmonary/Chest: Effort normal and breath sounds normal. No respiratory distress. She has no wheezes. She exhibits no tenderness.   Abdominal: Soft. Bowel sounds are normal. She exhibits no distension. There is no tenderness.   Musculoskeletal: Normal range of motion. She exhibits no edema or tenderness.   Lymphadenopathy:     She has cervical adenopathy.   Skin: Skin is warm and dry. No rash noted. She is not diaphoretic. No erythema.   Psychiatric: She has a normal mood and affect. Her behavior is normal.       Assessment:       1. Depression with anxiety    2. Reactive hypoglycemia    3. Sinusitis, unspecified chronicity, unspecified location    4. Hx of papillary thyroid carcinoma        Plan:       Depression with anxiety  -     busPIRone (BUSPAR) 15 MG tablet; Take 1 tablet (15 mg total) by mouth 3 (three) times daily.  Dispense: 90 tablet; Refill: 11    Reactive hypoglycemia   Patient missed endocrine appointment in February, hasn't been able to reschedule with [facility] endocrine.    Trying to set up endocrine f/u through [facility] as no new patients can be added to the [facility] schedule until the new schedule  comes out in July   Patient has injectable for glucagon for hypoglycemic emergency    Sinusitis, unspecified chronicity, unspecified location  -     fluticasone (FLONASE) 50 mcg/actuation nasal spray; 1 spray by Each Nare route once daily.  Dispense: 1 Bottle; Refill: 0   Patient will f/u with ENT/oncologist for recommendations/management of recurrent sinus infections.    Hx of papillary thyroid carcinoma   Patient will be contacting ENT/oncologist for recommendations/management of papillary thyroid carcinoma    No Follow-up on file.

## 2017-05-30 ENCOUNTER — OFFICE VISIT (OUTPATIENT)
Dept: FAMILY MEDICINE | Facility: HOSPITAL | Age: 32
End: 2017-05-30
Attending: FAMILY MEDICINE
Payer: MEDICAID

## 2017-05-30 VITALS
HEART RATE: 82 BPM | DIASTOLIC BLOOD PRESSURE: 84 MMHG | HEIGHT: 61 IN | WEIGHT: 181.44 LBS | BODY MASS INDEX: 34.26 KG/M2 | SYSTOLIC BLOOD PRESSURE: 128 MMHG

## 2017-05-30 DIAGNOSIS — L29.9 PRURITUS: Primary | ICD-10-CM

## 2017-05-30 DIAGNOSIS — E89.0 POSTOPERATIVE HYPOTHYROIDISM: ICD-10-CM

## 2017-05-30 DIAGNOSIS — E16.2 MULTIPLE EPISODES OF HYPOGLYCEMIA: ICD-10-CM

## 2017-05-30 PROCEDURE — 99213 OFFICE O/P EST LOW 20 MIN: CPT | Performed by: FAMILY MEDICINE

## 2017-05-30 RX ORDER — HYDROXYZINE HYDROCHLORIDE 25 MG/1
25 TABLET, FILM COATED ORAL 3 TIMES DAILY
Qty: 90 TABLET | Refills: 1 | Status: SHIPPED | OUTPATIENT
Start: 2017-05-30 | End: 2017-10-26

## 2017-05-30 NOTE — PROGRESS NOTES
"Subjective:       Patient ID: Monique Rai is a 32 y.o. female.    Chief Complaint: Blood Sugar Problem    HPI     This 33 y/o female with a history of thyroid cancer, gastric surgery, and anxiety presents to the clinic complaining of "problems with my blood sugar bottoming out." She states that during these episodes, she begins to feel "woozy" and "passes out" occasionally. Her fiance at bedside reports the episodes last approximately 1 minute and the patient regains consciousness. The patient reports associated nausea and generalized weakness after regaining consciousness. She reports having similar episodes in the past since her last pregnancy in 2015. She states the episodes have increased in frequency with episodes occurring approximately twice per week. She reports episodes occur spontaneously but has noticed they occur consistently after eating breakfast and lunch. She states she has begun to skip these meals and only eats dinner. She states she was previously prescribed Glucagon for these episodes, but she states she has not used the shot. Despite her decreased food intake, she reports gaining 10 pounds in the last week.     She also complains of severe and progressively worsening generalized itching for 1 week. She states she has been moisturizing with her usual coconut oil and organic baby lotion without relief. She reports no new shampoos, lotions, or perfumes. 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 She denies associated fever, chills, rashes or sores. She reports a history of a pruritic rash to her hands that has been attributed to stress. However, she reports no recent exacerbation of this rash.     She also complains of adverse medication reaction to her previously prescribed Buspar. She states she took the initial 2 doses without complications, but reports "feeling like a crackhead" after taking it at night with her Lexapro. She " states she has not taken the medication since the episode.     Review of Systems   Constitutional: Positive for unexpected weight change. Negative for appetite change, chills, diaphoresis and fever.   Gastrointestinal: Negative for abdominal pain, nausea and vomiting.   Endocrine: Positive for polyuria.   Skin: Negative for rash.        (+) Pruritis    Neurological: Positive for syncope. Negative for seizures and headaches.        Objective:      Vitals:    05/30/17 0915   BP: 128/84   Pulse: 82     Physical Exam   Constitutional: She appears well-developed and well-nourished. No distress.   Neck: Normal range of motion. Neck supple. No thyromegaly present.   Cardiovascular: Normal rate and regular rhythm.    Pulmonary/Chest: Effort normal and breath sounds normal.   Musculoskeletal: Normal range of motion.   Lymphadenopathy:     She has no cervical adenopathy.   Skin: Skin is warm. Bruising (bilateral thighs) noted. No petechiae and no rash noted. No erythema.   Excoriations to the upper extremities.      Psychiatric: She has a normal mood and affect.       Assessment:       1. Pruritus    2. Postoperative hypothyroidism    3. Multiple episodes of hypoglycemia        Plan:       Pruritus  -     Comprehensive metabolic panel; Future; Expected date: 05/30/2017  -     Bile acids, cholylglycine; Future; Expected date: 05/30/2017  -     hydrOXYzine HCl (ATARAX) 25 MG tablet; Take 1 tablet (25 mg total) by mouth 3 (three) times daily.  Dispense: 90 tablet; Refill: 1    Postoperative hypothyroidism  -     TSH; Future; Expected date: 05/30/2017  -She reports increased fatigue- will obtain thyroid panel    Multiple episodes of hypoglycemia  Patient missed endocrine appointment in February, hasn't been able to reschedule with [facility] endocrine.                         Trying to set up endocrine f/u through [facility] as no new patients can be added to the [facility] schedule until the new schedule comes out in July                         Patient has injectable for glucagon for hypoglycemic emergency       Return in about 3 weeks (around 6/20/2017).

## 2017-06-01 ENCOUNTER — OFFICE VISIT (OUTPATIENT)
Dept: OTOLARYNGOLOGY | Facility: CLINIC | Age: 32
End: 2017-06-01
Payer: MEDICAID

## 2017-06-01 VITALS
WEIGHT: 182.31 LBS | BODY MASS INDEX: 34.45 KG/M2 | TEMPERATURE: 98 F | SYSTOLIC BLOOD PRESSURE: 114 MMHG | HEART RATE: 99 BPM | DIASTOLIC BLOOD PRESSURE: 72 MMHG

## 2017-06-01 DIAGNOSIS — Z85.850 HX OF PAPILLARY THYROID CARCINOMA: Primary | ICD-10-CM

## 2017-06-01 PROCEDURE — 99999 PR PBB SHADOW E&M-EST. PATIENT-LVL III: CPT | Mod: PBBFAC,,, | Performed by: OTOLARYNGOLOGY

## 2017-06-01 PROCEDURE — 99213 OFFICE O/P EST LOW 20 MIN: CPT | Mod: PBBFAC | Performed by: OTOLARYNGOLOGY

## 2017-06-01 PROCEDURE — 99213 OFFICE O/P EST LOW 20 MIN: CPT | Mod: S$PBB,,, | Performed by: OTOLARYNGOLOGY

## 2017-06-01 NOTE — PROGRESS NOTES
Chief Complaint   Patient presents with    Follow-up     Treatment History  9/28/15: Total thyroidectomy for [id number] papillary thyroid carcinoma ([facility]).    HPI   32 y.o. female presents with the above treatment history.   She reports L neck discomfort but no kristen LAD.  Her last Tg was 0.1.  Neck US in  unremarkable.  She is on Rector thyroid.  Was found to be slightly hyperthyroid in .     Review of Systems   Constitutional: Negative for fatigue and unexpected weight change.   HENT: Per HPI.  Eyes: Negative for visual disturbance.   Respiratory: Negative for shortness of breath, hemoptysis   Cardiovascular: Negative for chest pain and palpitations.   Musculoskeletal: Negative for decreased ROM, back pain.   Skin: Negative for rash, sunburn, itching.   Neurological: Negative for dizziness and seizures.   Hematological: Negative for adenopathy. Does not bruise/bleed easily.   Endocrine: Negative for rapid weight loss/weight gain, heat/cold intolerance.     Past Medical History   Patient Active Problem List   Diagnosis    Neck pain    Pharyngitis    Thrombosed external hemorrhoid    Goiter    Rhinitis    URI (upper respiratory infection)    Anxiety and depression    Fatigue    Perineal rash in female    Allergic rhinitis    Hx of papillary thyroid carcinoma    H/O thyroidectomy    .    Postoperative hypothyroidism    Mastitis    Pyelonephritis    Malaise                       Past Surgical History   Thyroidectomy  Gastric sleeve   x 3      Family History   Thyroid cancer        Social History   .  Social History     Social History    Marital status:      Spouse name: N/A    Number of children: N/A    Years of education: N/A     Occupational History    Not on file.     Social History Main Topics    Smoking status: Former Smoker     Packs/day: 0.50     Years: 3.00    Smokeless tobacco: Not on file    Alcohol use No    Drug use: Unknown    Sexual  activity: Not on file     Other Topics Concern    Not on file     Social History Narrative    No narrative on file         Allergies   No Known Allergies        Physical Exam     There were no vitals filed for this visit.      There is no height or weight on file to calculate BMI.      General: AOx3, NAD   Right Ear: External Auditory Canal WNL,TM w/o masses/lesions/perforations.  Middle ear without evidence of effusion.   Left Ear: External Auditory Canal WNL,TM w/o masses/lesions/perforations.  Middle ear without evidence of effusion.   Nose: No gross nasal septal deviation. Inferior Turbinates WNL bilaterally. No septal perforation. No masses/lesions.   Oral Cavity:  Oral Tongue mobile, no lesions noted. Hard Palate WNL. No buccal or FOM lesions.  Oropharynx:  No masses/lesions of the posterior pharyngeal wall. Tonsillar fossa without lesions. Soft palate without masses. Midline uvula.   Neck: Well-healed thyroidectomy scar.  No cervical lymphadenopathy, thyromegaly or thyroid nodules.    Face: House Brackmann I bilaterally.       Assessment   1. Hx of papillary thyroid carcinoma        Plan   32 y.o. female with [id number] papillary thyroid carcinoma status post total thyroidectomy in 9/15.  YG clinically.  Repeat TSH, Tg and US.  I will contact her with the results of these tests.

## 2017-06-06 ENCOUNTER — OFFICE VISIT (OUTPATIENT)
Dept: FAMILY MEDICINE | Facility: HOSPITAL | Age: 32
End: 2017-06-06
Attending: FAMILY MEDICINE
Payer: MEDICAID

## 2017-06-06 ENCOUNTER — LAB VISIT (OUTPATIENT)
Dept: LAB | Facility: HOSPITAL | Age: 32
End: 2017-06-06
Payer: MEDICAID

## 2017-06-06 VITALS
RESPIRATION RATE: 20 BRPM | BODY MASS INDEX: 34.41 KG/M2 | HEART RATE: 101 BPM | SYSTOLIC BLOOD PRESSURE: 124 MMHG | WEIGHT: 182.13 LBS | DIASTOLIC BLOOD PRESSURE: 74 MMHG

## 2017-06-06 DIAGNOSIS — L29.9 PRURITUS: ICD-10-CM

## 2017-06-06 DIAGNOSIS — R21 RASH AND NONSPECIFIC SKIN ERUPTION: ICD-10-CM

## 2017-06-06 DIAGNOSIS — Z85.850 HX OF PAPILLARY THYROID CARCINOMA: ICD-10-CM

## 2017-06-06 DIAGNOSIS — K91.1 DUMPING SYNDROME: Primary | ICD-10-CM

## 2017-06-06 DIAGNOSIS — Z85.850 HX OF PAPILLARY THYROID CARCINOMA: Primary | ICD-10-CM

## 2017-06-06 LAB
ALBUMIN SERPL BCP-MCNC: 3.6 G/DL
ALP SERPL-CCNC: 77 U/L
ALT SERPL W/O P-5'-P-CCNC: 21 U/L
ANION GAP SERPL CALC-SCNC: 6 MMOL/L
AST SERPL-CCNC: 18 U/L
BASOPHILS # BLD AUTO: 0.03 K/UL
BASOPHILS NFR BLD: 0.6 %
BILIRUB SERPL-MCNC: 0.6 MG/DL
BUN SERPL-MCNC: 12 MG/DL
CALCIUM SERPL-MCNC: 9 MG/DL
CHLORIDE SERPL-SCNC: 106 MMOL/L
CO2 SERPL-SCNC: 27 MMOL/L
CREAT SERPL-MCNC: 0.9 MG/DL
DIFFERENTIAL METHOD: ABNORMAL
EOSINOPHIL # BLD AUTO: 0.3 K/UL
EOSINOPHIL NFR BLD: 5.3 %
ERYTHROCYTE [DISTWIDTH] IN BLOOD BY AUTOMATED COUNT: 14.2 %
EST. GFR  (AFRICAN AMERICAN): >60 ML/MIN/1.73 M^2
EST. GFR  (NON AFRICAN AMERICAN): >60 ML/MIN/1.73 M^2
GLUCOSE SERPL-MCNC: 88 MG/DL
HCT VFR BLD AUTO: 36.6 %
HGB BLD-MCNC: 11.5 G/DL
LYMPHOCYTES # BLD AUTO: 1.8 K/UL
LYMPHOCYTES NFR BLD: 34.3 %
MCH RBC QN AUTO: 24.9 PG
MCHC RBC AUTO-ENTMCNC: 31.4 %
MCV RBC AUTO: 79 FL
MONOCYTES # BLD AUTO: 0.4 K/UL
MONOCYTES NFR BLD: 6.6 %
NEUTROPHILS # BLD AUTO: 2.8 K/UL
NEUTROPHILS NFR BLD: 53 %
PLATELET # BLD AUTO: 284 K/UL
PMV BLD AUTO: 10.4 FL
POTASSIUM SERPL-SCNC: 4 MMOL/L
PROT SERPL-MCNC: 6.9 G/DL
RBC # BLD AUTO: 4.61 M/UL
SODIUM SERPL-SCNC: 139 MMOL/L
TSH SERPL DL<=0.005 MIU/L-ACNC: 0.46 UIU/ML
WBC # BLD AUTO: 5.33 K/UL

## 2017-06-06 PROCEDURE — 99213 OFFICE O/P EST LOW 20 MIN: CPT | Performed by: FAMILY MEDICINE

## 2017-06-06 PROCEDURE — 36415 COLL VENOUS BLD VENIPUNCTURE: CPT

## 2017-06-06 PROCEDURE — 84432 ASSAY OF THYROGLOBULIN: CPT

## 2017-06-06 PROCEDURE — 84443 ASSAY THYROID STIM HORMONE: CPT

## 2017-06-06 PROCEDURE — 82239 BILE ACIDS TOTAL: CPT

## 2017-06-06 PROCEDURE — 80053 COMPREHEN METABOLIC PANEL: CPT

## 2017-06-06 PROCEDURE — 85025 COMPLETE CBC W/AUTO DIFF WBC: CPT

## 2017-06-06 NOTE — PROGRESS NOTES
Subjective:       Patient ID: Monique Rai is a 32 y.o. female.    Chief Complaint: Rash    HPI   33 yo with pmhx of early and late dumping syndrome, pappilary cancer, and hypoglycemia presents with 2 day history of generalized rash all over her body. It started in the abdomen and has spread to all over the body. She had pruritus 2 weeks ago and was given Atarax, which she used only twice and that resolved. 2 days ago she noticed the rash. She also complaints of bruising easily, bleed when brush teeth, and she has mouth ulcers. She has not been eating well but has gained 20 lbs. She also complaints of being fatigue. Pt has been given vitamin supplements for her dumping syndrome, but she is non-compliant with her vitamins and she is also not taking a good diet with fruits and proteins.     Review of Systems   Constitutional: Positive for appetite change and fatigue. Negative for chills and fever.   HENT: Negative.    Respiratory: Negative.    Cardiovascular: Negative.    Gastrointestinal: Negative.    Endocrine: Positive for cold intolerance.   Skin: Positive for rash. Negative for color change and pallor.   Neurological: Negative.    Hematological: Bruises/bleeds easily.       Objective:      Vitals:    06/06/17 1507   BP: 124/74   Pulse: 101   Resp: 20     Physical Exam   Constitutional: She is oriented to person, place, and time. She appears well-developed and well-nourished. No distress.   HENT:   Head: Normocephalic and atraumatic.   Nose: Nose normal.   Mouth/Throat: No oropharyngeal exudate.   Eyes: Conjunctivae are normal. Right eye exhibits no discharge. Left eye exhibits no discharge.   Neck: Normal range of motion. Neck supple. No JVD present. No tracheal deviation present.   Cardiovascular: Normal rate, regular rhythm, normal heart sounds and intact distal pulses.  Exam reveals no gallop and no friction rub.    No murmur heard.  Pulmonary/Chest: Effort normal and breath sounds normal. No stridor.  No respiratory distress. She has no wheezes. She has no rales. She exhibits no tenderness.   Abdominal: Soft. Bowel sounds are normal. She exhibits no distension and no mass. There is no tenderness. There is no guarding.   Musculoskeletal: Normal range of motion. She exhibits no edema, tenderness or deformity.   Neurological: She is alert and oriented to person, place, and time.   Skin: Skin is warm and dry. Purpura and rash noted. She is not diaphoretic. No erythema. No pallor.   Nursing note and vitals reviewed.      Assessment:       1. Dumping syndrome    2. Rash and nonspecific skin eruption        Plan:       Dumping syndrome  -     Vitamin B12; Future; Expected date: 06/06/2017  -     VITAMIN C; Future; Expected date: 06/06/2017  -     VITAMIN A; Future; Expected date: 06/06/2017  -     VITAMIN D; Future; Expected date: 06/06/2017  -     VITAMIN K 1; Future; Expected date: 06/06/2017  -     VITAMIN B1; Future; Expected date: 06/06/2017  -     VITAMIN B6; Future; Expected date: 06/06/2017  -     VITAMIN E; Future; Expected date: 06/06/2017    Rash and nonspecific skin eruption  -     Vitamin B12; Future; Expected date: 06/06/2017  -     VITAMIN C; Future; Expected date: 06/06/2017  -     VITAMIN A; Future; Expected date: 06/06/2017  -     VITAMIN D; Future; Expected date: 06/06/2017  -     VITAMIN K 1; Future; Expected date: 06/06/2017  -     VITAMIN B1; Future; Expected date: 06/06/2017  -     VITAMIN B6; Future; Expected date: 06/06/2017  -     VITAMIN E; Future; Expected date: 06/06/2017    Vit C Deficiency (Scurvy):  - she has all the signs and symptoms for Vit C deficiency. Will perform a lab and in the mean time pt was advised to take her Vit C supplements that she was prescribed but she has been non-compliant.       Return in about 1 week (around 6/13/2017).     6/6/2017 5:03 PM Abby Greer M.D.

## 2017-06-07 LAB
BILE AC SERPL-SCNC: 13 MCMOL/L
THRYOGLOBULIN INTERPRETATION: ABNORMAL
THYROGLOB AB SERPL-ACNC: <1.8 IU/ML
THYROGLOB SERPL-MCNC: 0.4 NG/ML

## 2017-06-08 ENCOUNTER — PATIENT MESSAGE (OUTPATIENT)
Dept: OTOLARYNGOLOGY | Facility: CLINIC | Age: 32
End: 2017-06-08

## 2017-06-09 ENCOUNTER — LAB VISIT (OUTPATIENT)
Dept: LAB | Facility: HOSPITAL | Age: 32
End: 2017-06-09
Attending: FAMILY MEDICINE
Payer: MEDICAID

## 2017-06-09 DIAGNOSIS — R21 RASH AND NONSPECIFIC SKIN ERUPTION: ICD-10-CM

## 2017-06-09 DIAGNOSIS — K91.1 DUMPING SYNDROME: ICD-10-CM

## 2017-06-09 LAB
25(OH)D3+25(OH)D2 SERPL-MCNC: 28 NG/ML
VIT B12 SERPL-MCNC: 196 PG/ML

## 2017-06-09 PROCEDURE — 84207 ASSAY OF VITAMIN B-6: CPT

## 2017-06-09 PROCEDURE — 84446 ASSAY OF VITAMIN E: CPT

## 2017-06-09 PROCEDURE — 84597 ASSAY OF VITAMIN K: CPT

## 2017-06-09 PROCEDURE — 84590 ASSAY OF VITAMIN A: CPT

## 2017-06-09 PROCEDURE — 82306 VITAMIN D 25 HYDROXY: CPT

## 2017-06-09 PROCEDURE — 82607 VITAMIN B-12: CPT

## 2017-06-09 PROCEDURE — 84425 ASSAY OF VITAMIN B-1: CPT

## 2017-06-09 PROCEDURE — 36415 COLL VENOUS BLD VENIPUNCTURE: CPT

## 2017-06-09 PROCEDURE — 82180 ASSAY OF ASCORBIC ACID: CPT

## 2017-06-10 DIAGNOSIS — J32.9 SINUSITIS, UNSPECIFIED CHRONICITY, UNSPECIFIED LOCATION: ICD-10-CM

## 2017-06-12 ENCOUNTER — HOSPITAL ENCOUNTER (OUTPATIENT)
Dept: RADIOLOGY | Facility: HOSPITAL | Age: 32
Discharge: HOME | End: 2017-06-12
Attending: OTOLARYNGOLOGY
Payer: MEDICAID

## 2017-06-12 DIAGNOSIS — Z85.850 HX OF PAPILLARY THYROID CARCINOMA: ICD-10-CM

## 2017-06-12 LAB
A-TOCOPHEROL VIT E SERPL-MCNC: 1069 UG/DL (ref 500–1800)
VIT A SERPL-MCNC: 39 UG/DL (ref 38–106)

## 2017-06-12 PROCEDURE — 76536 US EXAM OF HEAD AND NECK: CPT | Mod: TC

## 2017-06-12 PROCEDURE — 76536 US EXAM OF HEAD AND NECK: CPT | Mod: 26,,, | Performed by: INTERNAL MEDICINE

## 2017-06-12 RX ORDER — FLUTICASONE PROPIONATE 50 MCG
SPRAY, SUSPENSION (ML) NASAL
Qty: 1 BOTTLE | Refills: 6 | Status: SHIPPED | OUTPATIENT
Start: 2017-06-12 | End: 2018-07-16

## 2017-06-13 ENCOUNTER — PATIENT MESSAGE (OUTPATIENT)
Dept: OTOLARYNGOLOGY | Facility: CLINIC | Age: 32
End: 2017-06-13

## 2017-06-13 LAB
PYRIDOXAL SERPL-MCNC: 4 UG/L (ref 5–50)
VIT B1 SERPL-MCNC: 31 UG/L (ref 38–122)

## 2017-06-14 ENCOUNTER — OFFICE VISIT (OUTPATIENT)
Dept: FAMILY MEDICINE | Facility: HOSPITAL | Age: 32
End: 2017-06-14
Attending: FAMILY MEDICINE
Payer: MEDICAID

## 2017-06-14 VITALS
HEIGHT: 60 IN | WEIGHT: 181.19 LBS | SYSTOLIC BLOOD PRESSURE: 99 MMHG | HEART RATE: 93 BPM | BODY MASS INDEX: 35.57 KG/M2 | DIASTOLIC BLOOD PRESSURE: 66 MMHG

## 2017-06-14 DIAGNOSIS — E56.9 MULTIPLE VITAMIN DEFICIENCY: ICD-10-CM

## 2017-06-14 DIAGNOSIS — E89.0 POSTOPERATIVE HYPOTHYROIDISM: ICD-10-CM

## 2017-06-14 DIAGNOSIS — Z85.850 HX OF PAPILLARY THYROID CARCINOMA: Primary | ICD-10-CM

## 2017-06-14 LAB — VIT C SERPL-MCNC: 8 MG/L (ref 2–19)

## 2017-06-14 PROCEDURE — 99213 OFFICE O/P EST LOW 20 MIN: CPT | Performed by: FAMILY MEDICINE

## 2017-06-14 NOTE — PROGRESS NOTES
Subjective:       Patient ID: Monique Rai is a 32 y.o. female.    Chief Complaint: Follow-up    HPI   31yo female with hx papillary thyroid carcinoma s/p thyroidectomy presents to clinic for f/u. At last visit, pt had an itchy rash with pinpoint red spots that has since improved on Atarax. Today, she is concerned with left neck pain near residual L-sided lymph node. Dr. Everett performed an U/S last week and recommended further imaging in 6mo due to stability of node at this time, however pt says it has been causing her discomfort. Additionally, she continues to experience dizziness and nausea after eating. She has stopped eating breakfast or lunch and only eats food at night with meds/multivitamin. After dinner, she has to take Zofran and lie down 2/2 nausea and spinning sensation.    Review of Systems   Constitutional: Positive for appetite change. Negative for fever.        Dizziness and generalized malaise with PO intake.   HENT: Positive for mouth sores. Negative for congestion, sore throat and trouble swallowing.         Sores on tip of tongue, which have occurred before when she was vitamin C deficient.   Eyes: Negative for visual disturbance.   Respiratory: Negative for cough, chest tightness and shortness of breath.    Cardiovascular: Negative for chest pain and leg swelling.   Gastrointestinal: Positive for nausea. Negative for vomiting.        Nausea after eating.   Endocrine: Positive for cold intolerance.   Musculoskeletal: Negative for gait problem.   Skin: Negative for rash.   Neurological: Positive for dizziness and light-headedness. Negative for syncope.        Dizzy after meals. Denies syncopal episodes since she stopped eating breakfast.   Psychiatric/Behavioral: The patient is not nervous/anxious.         Reports decreased generalized anxiety, with stress primarily at work.       Objective:      Vitals:    06/14/17 0935   BP: 99/66   Pulse: 93     Physical Exam   Constitutional: She is  oriented to person, place, and time. She appears well-developed and well-nourished. No distress.   HENT:   Head: Normocephalic and atraumatic.   Nose: Nose normal.   Mouth/Throat: Oropharynx is clear and moist.   Two 1mm white ulcers on tip of tongue.   Eyes: Conjunctivae and EOM are normal. Pupils are equal, round, and reactive to light.   Neck: Normal range of motion. Neck supple.   Small palpable LN left of midline, 1-2cm   Cardiovascular: Normal rate, regular rhythm, normal heart sounds and intact distal pulses.    Pulmonary/Chest: Effort normal and breath sounds normal. No respiratory distress. She has no wheezes.   Abdominal: Soft. Bowel sounds are normal.   Musculoskeletal: Normal range of motion. She exhibits no edema or deformity.   Neurological: She is alert and oriented to person, place, and time.   Skin: Skin is warm and dry. No rash noted.   Psychiatric: She has a normal mood and affect. Her behavior is normal. Judgment and thought content normal.       Assessment:       1. Hx of papillary thyroid carcinoma    2. Postoperative hypothyroidism    3. Multiple vitamin deficiency        Plan:       Hx of papillary thyroid carcinoma/Postoperative hypothyroidism        - ultrasound on 6/12: prominent left level II lymph node, stable/slightly decreased since prior study in Jan 2016        - follows with endocrine; next appt: 6/15 at 10:30am  -Thyroid panel within acceptable range. Continue current dose of thyroid replacement     Multiple vitamin deficiency        - hx gastric bypass with recent malabsorption issues  -Vit C and K still pending  -Vit D-[id number] are deficient        - continue vitamin supplementation    Hypoglycemia; post-prandial        - will follow-up with endocrine tomorrow 6/15  -There is a concern for reactive hypoglycemia. She is advised to maintain her follow up.       Return in about 1 month (around 7/14/2017).

## 2017-06-16 LAB — PHYTONADIONE SERPL-MCNC: 207 PG/ML (ref 80–1160)

## 2017-06-26 RX ORDER — CYANOCOBALAMIN 1000 UG/ML
INJECTION, SOLUTION INTRAMUSCULAR; SUBCUTANEOUS
Qty: 11 ML | Refills: 0 | OUTPATIENT
Start: 2017-06-26

## 2017-06-26 RX ORDER — CYANOCOBALAMIN 1000 UG/ML
INJECTION, SOLUTION INTRAMUSCULAR; SUBCUTANEOUS
Qty: 11 ML | Refills: 0 | Status: SHIPPED | OUTPATIENT
Start: 2017-06-26 | End: 2018-07-20 | Stop reason: SDUPTHER

## 2017-06-27 ENCOUNTER — OFFICE VISIT (OUTPATIENT)
Dept: FAMILY MEDICINE | Facility: HOSPITAL | Age: 32
End: 2017-06-27
Attending: FAMILY MEDICINE
Payer: MEDICAID

## 2017-06-27 VITALS
DIASTOLIC BLOOD PRESSURE: 68 MMHG | WEIGHT: 181.88 LBS | SYSTOLIC BLOOD PRESSURE: 107 MMHG | HEART RATE: 85 BPM | HEIGHT: 60 IN | BODY MASS INDEX: 35.71 KG/M2

## 2017-06-27 DIAGNOSIS — E89.0 POSTOPERATIVE HYPOTHYROIDISM: ICD-10-CM

## 2017-06-27 DIAGNOSIS — R11.0 NAUSEA: Primary | ICD-10-CM

## 2017-06-27 DIAGNOSIS — Z30.9 ENCOUNTER FOR CONTRACEPTIVE MANAGEMENT, UNSPECIFIED TYPE: ICD-10-CM

## 2017-06-27 DIAGNOSIS — Z85.850 HX OF PAPILLARY THYROID CARCINOMA: ICD-10-CM

## 2017-06-27 PROCEDURE — 99213 OFFICE O/P EST LOW 20 MIN: CPT | Performed by: FAMILY MEDICINE

## 2017-06-27 RX ORDER — ONDANSETRON 4 MG/1
4 TABLET, FILM COATED ORAL EVERY 6 HOURS PRN
Qty: 30 TABLET | Refills: 0 | Status: SHIPPED | OUTPATIENT
Start: 2017-06-27 | End: 2017-09-18 | Stop reason: DRUGHIGH

## 2017-06-27 NOTE — PROGRESS NOTES
Subjective:       Patient ID: Monique Rai is a 32 y.o. female.    Chief Complaint: Follow-up    HPI     31yo female with hx papillary thyroid carcinoma s/p thyroidectomy presents to clinic for f/u. Since he last she has been experiencing intermittent episodes of nausea w/o emesis. otehrwise she has no other active complaints.      Review of Systems   Constitutional: Negative for appetite change, chills, fatigue and fever.   HENT: Negative for congestion, rhinorrhea, sneezing, sore throat, tinnitus and trouble swallowing.    Eyes: Negative for photophobia and itching.   Respiratory: Negative for cough, shortness of breath and wheezing.    Cardiovascular: Negative for chest pain and palpitations.   Gastrointestinal: Positive for nausea. Negative for abdominal pain, constipation, diarrhea and vomiting.   Endocrine: Negative for cold intolerance and heat intolerance.   Genitourinary: Negative for dysuria, frequency and urgency.   Musculoskeletal: Negative for arthralgias and gait problem.   Skin: Negative for rash.   Neurological: Negative for dizziness, tremors, weakness, light-headedness and headaches.   Hematological: Negative for adenopathy.   Psychiatric/Behavioral: Negative for agitation and sleep disturbance. The patient is not nervous/anxious.        Objective:      Vitals:    06/27/17 1608   BP: 107/68   Pulse: 85     Physical Exam   Constitutional: She is oriented to person, place, and time. She appears well-developed and well-nourished. No distress.   obese   HENT:   Head: Normocephalic and atraumatic.   Right Ear: External ear normal.   Left Ear: External ear normal.   Mouth/Throat: Oropharynx is clear and moist.   Eyes: Conjunctivae are normal. Pupils are equal, round, and reactive to light.   Neck: Normal range of motion. Neck supple. No JVD present.   Cardiovascular: Normal rate, regular rhythm, normal heart sounds and intact distal pulses.    Pulmonary/Chest: Effort normal and breath sounds  normal. She has no wheezes. She has no rales.   Abdominal: Soft. Bowel sounds are normal. She exhibits no distension. There is no tenderness.   Musculoskeletal: She exhibits no edema.   Lymphadenopathy:     She has cervical adenopathy.   Neurological: She is alert and oriented to person, place, and time.   Skin: Skin is warm and dry. No rash noted.   Psychiatric: She has a normal mood and affect. Thought content normal.       Assessment:       1. Nausea    2. Hx of papillary thyroid carcinoma    3. Postoperative hypothyroidism    4. Encounter for contraceptive management, unspecified type        Plan:       Nausea  -     ondansetron (ZOFRAN) 4 MG tablet; Take 1 tablet (4 mg total) by mouth every 6 (six) hours as needed.  Dispense: 30 tablet; Refill: 0    Hx of papillary thyroid carcinoma/Postoperative hypothyroidism  -Pt has followed up with [facility] endocrine who have referred her to an endocrine surgeon. PLan is to aspirate the thyroid and check for markers. However, she will most likely still need a thyroidectomy followed by radiation.  -Her hypothyroid symptoms are well controlled with armour. She is advised to continue the current dose. Last thyroid panel was within appropriate range.       Birth Control  -Pt is strongly advised to use multiple methods of birthcontrol due to her ongoing cancer workup/treatment  -after much counseling we have decided to go with the [facility] as her BC. She denies hx of liver pathology, Fhx of clotting- she does not smoke  - paper work is completed, she is to RTC for insertion    Return in about 1 month (around 7/27/2017).

## 2017-06-27 NOTE — PROGRESS NOTES
I have reviewed the resident's documentation, the patient's medical history, the resident's findings on physical examination, the patient's diagnosis, and the treatment plan as developed by the resident. I have discussed this case with the resident.     I attest that I did not have any responsibilities other than the supervision of residents at the time this service was provided by the resident. I attest the care provided by the resident was reasonable and necessary.

## 2017-08-01 ENCOUNTER — DOCUMENTATION ONLY (OUTPATIENT)
Dept: OTOLARYNGOLOGY | Facility: CLINIC | Age: 32
End: 2017-08-01

## 2017-08-01 DIAGNOSIS — R59.1 LYMPHADENOPATHY: Primary | ICD-10-CM

## 2017-08-02 ENCOUNTER — HOSPITAL ENCOUNTER (OUTPATIENT)
Dept: RADIOLOGY | Facility: HOSPITAL | Age: 32
Discharge: HOME | End: 2017-08-02
Attending: OTOLARYNGOLOGY
Payer: MEDICAID

## 2017-08-02 DIAGNOSIS — R59.1 LYMPHADENOPATHY: ICD-10-CM

## 2017-08-07 ENCOUNTER — HOSPITAL ENCOUNTER (OUTPATIENT)
Dept: RADIOLOGY | Facility: HOSPITAL | Age: 32
Discharge: HOME | End: 2017-08-07
Attending: OTOLARYNGOLOGY
Payer: MEDICAID

## 2017-08-07 ENCOUNTER — PATIENT MESSAGE (OUTPATIENT)
Dept: OTOLARYNGOLOGY | Facility: CLINIC | Age: 32
End: 2017-08-07

## 2017-08-07 ENCOUNTER — DOCUMENTATION ONLY (OUTPATIENT)
Dept: OTOLARYNGOLOGY | Facility: CLINIC | Age: 32
End: 2017-08-07

## 2017-08-07 LAB
CREAT SERPL-MCNC: 0.8 MG/DL (ref 0.5–1.4)
SAMPLE: NORMAL

## 2017-08-07 PROCEDURE — 70491 CT SOFT TISSUE NECK W/DYE: CPT | Mod: 26,,, | Performed by: RADIOLOGY

## 2017-08-07 PROCEDURE — 70491 CT SOFT TISSUE NECK W/DYE: CPT | Mod: TC

## 2017-08-07 PROCEDURE — 25500020 PHARM REV CODE 255: Performed by: OTOLARYNGOLOGY

## 2017-08-07 RX ADMIN — IOHEXOL 75 ML: 350 INJECTION, SOLUTION INTRAVENOUS at 09:08

## 2017-08-14 ENCOUNTER — OFFICE VISIT (OUTPATIENT)
Dept: OTOLARYNGOLOGY | Facility: CLINIC | Age: 32
End: 2017-08-14
Payer: MEDICAID

## 2017-08-14 VITALS
HEART RATE: 77 BPM | DIASTOLIC BLOOD PRESSURE: 64 MMHG | WEIGHT: 181.88 LBS | SYSTOLIC BLOOD PRESSURE: 110 MMHG | BODY MASS INDEX: 35.52 KG/M2

## 2017-08-14 DIAGNOSIS — Z85.850 HX OF PAPILLARY THYROID CARCINOMA: ICD-10-CM

## 2017-08-14 DIAGNOSIS — R59.0 LYMPHADENOPATHY, CERVICAL: Primary | ICD-10-CM

## 2017-08-14 PROCEDURE — 99999 PR PBB SHADOW E&M-EST. PATIENT-LVL III: CPT | Mod: PBBFAC,,, | Performed by: OTOLARYNGOLOGY

## 2017-08-14 PROCEDURE — 99213 OFFICE O/P EST LOW 20 MIN: CPT | Mod: S$PBB,,, | Performed by: OTOLARYNGOLOGY

## 2017-08-14 PROCEDURE — 3008F BODY MASS INDEX DOCD: CPT | Mod: ,,, | Performed by: OTOLARYNGOLOGY

## 2017-08-14 PROCEDURE — 99213 OFFICE O/P EST LOW 20 MIN: CPT | Mod: PBBFAC | Performed by: OTOLARYNGOLOGY

## 2017-08-14 RX ORDER — LIDOCAINE HYDROCHLORIDE 10 MG/ML
1 INJECTION, SOLUTION EPIDURAL; INFILTRATION; INTRACAUDAL; PERINEURAL ONCE
Status: CANCELLED | OUTPATIENT
Start: 2017-08-14 | End: 2017-08-14

## 2017-08-14 NOTE — PROGRESS NOTES
Chief Complaint   Patient presents with    Enlarged lymphnode     Treatment History  9/28/15: Total thyroidectomy for [id number] papillary thyroid carcinoma ([facility]).    HPI   32 y.o. female presents with the above treatment history.   She was seen at [facility] where she underwent FNA of a L cervical node.  Path revealed lymphocytes.  She reports persistent L neck and facial pain which has been present for mos.  This pain is localized to the L preauricular region.  She also reports associated pain above her L eye.  CT neck revealed mildly enlarged bilateral level II nodes.     Review of Systems   Constitutional: Negative for fatigue and unexpected weight change.   HENT: Per HPI.  Eyes: Negative for visual disturbance.   Respiratory: Negative for shortness of breath, hemoptysis   Cardiovascular: Negative for chest pain and palpitations.   Musculoskeletal: Negative for decreased ROM, back pain.   Skin: Negative for rash, sunburn, itching.   Neurological: Negative for dizziness and seizures.   Hematological: Negative for adenopathy. Does not bruise/bleed easily.   Endocrine: Negative for rapid weight loss/weight gain, heat/cold intolerance.     Past Medical History   Patient Active Problem List   Diagnosis    Neck pain    Pharyngitis    Thrombosed external hemorrhoid    Goiter    Rhinitis    URI (upper respiratory infection)    Anxiety and depression    Fatigue    Perineal rash in female    Allergic rhinitis    Hx of papillary thyroid carcinoma    H/O thyroidectomy    .    Postoperative hypothyroidism    Mastitis    Pyelonephritis    Malaise                       Past Surgical History   Thyroidectomy  Gastric sleeve   x 3      Family History   Thyroid cancer        Social History   .  Social History     Social History    Marital status:      Spouse name: N/A    Number of children: N/A    Years of education: N/A     Occupational History    Not on file.     Social History Main  Topics    Smoking status: Former Smoker     Packs/day: 0.50     Years: 3.00    Smokeless tobacco: Not on file    Alcohol use No    Drug use: Unknown    Sexual activity: Not on file     Other Topics Concern    Not on file     Social History Narrative    No narrative on file         Allergies   No Known Allergies        Physical Exam     Vitals:    08/14/17 0839   BP: 110/64   Pulse: 77         Body mass index is 35.52 kg/m².      General: AOx3, NAD   Right Ear: External Auditory Canal WNL,TM w/o masses/lesions/perforations.  Middle ear without evidence of effusion.   Left Ear: External Auditory Canal WNL,TM w/o masses/lesions/perforations.  Middle ear without evidence of effusion.   Nose: No gross nasal septal deviation. Inferior Turbinates WNL bilaterally. No septal perforation. No masses/lesions.   Oral Cavity:  Oral Tongue mobile, no lesions noted. Hard Palate WNL. No buccal or FOM lesions.  Oropharynx:  No masses/lesions of the posterior pharyngeal wall. Tonsillar fossa without lesions. Soft palate without masses. Midline uvula.   Neck: Well-healed thyroidectomy scar.  Small L level II node. No thyromegaly or thyroid nodules.    Face: House Brackmann I bilaterally.     CT neck: reviewed    Assessment   1. Lymphadenopathy, cervical    2. Hx of papillary thyroid carcinoma        Plan   32 y.o. female with [id number] papillary thyroid carcinoma status post total thyroidectomy in 9/15.  YG clinically.  FNA L neck mass revealed lymphocytes.  She wishes to undergo ex bx.  I explained that the primary objective of this operation is to rule out lymphoma.  She understands that her pain is unlikely to improve after this operation.    I explained the benefits of this procedure would be a diagnosis and elimination of the lesion.  The indication for surgery is the presence of a neck mass.  We discussed that the chief alternatives is observation, with potential for excisional biopsy if there was ever significant change.   The patient is interested in undergoing the procedure. The risks of left deep cervical lymph node excisional biopsy include, but are not limited to, infection, bleeding, scarring, weakness of the shoulder due to injury to the spinal accessory nerve, weakness of the lip due to injury to the marginal mandibular nerve, collection of blood or tissue fluid under the skin requiring drainage, failure to achieve the diagnosis, and the need for additional procedures.  Time was allowed for questions, and all questions were answered to the patient's apparent satisfaction.      Surgery is scheduled on 8/25/17.

## 2017-08-21 ENCOUNTER — ANESTHESIA EVENT (OUTPATIENT)
Dept: SURGERY | Facility: HOSPITAL | Age: 32
End: 2017-08-21
Payer: MEDICAID

## 2017-08-21 NOTE — ANESTHESIA PREPROCEDURE EVALUATION
Pre Admission Screening  Kelly Goode RN      []Hide copied text  []Hover for attribution information  Anesthesia Assessment: Preoperative EQUATION     Planned Procedure: Procedure(s) (LRB):  BIOPSY-LYMPH NODE (Left)  Requested Anesthesia Type:General  Surgeon: Bashir Olvera MD  Service: ENT  Known or anticipated Date of Surgery:8/25/2017     Surgeon notes: reviewed     Electronic QUestionnaire Assessment completed via nurse interview with patient.         NO AQ     Triage considerations:      The patient has no apparent active cardiac condition (No unstable coronary Syndrome such as severe unstable angina or recent [<1 month] myocardial infarction, decompensated CHF, severe valvular   disease or significant arrhythmia)     Previous anesthesia records:GETA, No problems and Not available     Last PCP note: within 3 months , within [facility]  6/27/17  Subspecialty notes: ENT     Other important co-morbidities: HX thyroid cancer, obesity     Tests already available:  Available tests,  within 3 months . 6/6/17 CBC, TSH, CMP                                                Instructions given. (See in Nurse's note)     Optimization:  Anesthesia Preop Clinic Assessment  Indicated-not required for this procedure                          Plan:              Testing:  none                                               Patient  has previously scheduled Medical Appointment:none     Navigation:                                   Straight Line to surgery.                                    No tests, anesthesia preop clinic visit, or consult required.                                                                                     Electronically signed by Kelly Goode RN at 8/21/2017 12:46 PM        Pre-admit on 8/25/2017            Detailed Report                                                                                                                      08/21/2017  Monique Rai is a 32 y.o.,  female.    Anesthesia Evaluation         Review of Systems  Anesthesia Hx:  Hx of Anesthetic complications PONV History of prior surgery of interest to airway management or planning: gastric bypass. Previous anesthesia: General Denies Family Hx of Anesthesia complications.   Denies Personal Hx of Anesthesia complications.   Social:  Former Smoker    Hematology/Oncology:         -- Anemia: Thyroid s/p surgery  Surgery: for resection   Hematology Comments: Last H/H 6/6/17 36.6 & 11.5  Current/Recent Cancer. Oncology Comments: 9/2015 thyroidectomy  Now has cervical lymphadenopathy     EENT/Dental:EENT/Dental Normal   Cardiovascular:  Cardiovascular Normal Exercise tolerance: good     Pulmonary:  Pulmonary Normal  Denies Shortness of breath.  Denies Recent URI.    Renal/:  Renal/ Normal     Hepatic/GI:  Hepatic/GI Normal    Musculoskeletal:  Musculoskeletal Normal    Neurological:  Neurology Normal    Endocrine:   Hypothyroidism  Hypoglycemia in Non-Diabetic, hx of gastric surgery  Thyroid Disease Hypothyroidism , s/p previous thyroid surgery.   Metabolic Disorders, Obesity / BMI > 30  Psych:   depression          Physical Exam  General:  Well nourished    Airway/Jaw/Neck:  Airway Findings: Mouth Opening: Normal Tongue: Normal  General Airway Assessment: Adult  Mallampati: II  TM Distance: Normal, at least 6 cm  Jaw/Neck Findings:     Neck ROM: Normal ROM      Dental:  Dental Findings: In tact   Chest/Lungs:  Chest/Lungs Findings: Clear to auscultation, Normal Respiratory Rate     Heart/Vascular:  Heart Findings: Rate: Normal  Rhythm: Regular Rhythm  Sounds: Normal        Mental Status:  Mental Status Findings:  Cooperative, Alert and Oriented         Anesthesia Plan  Type of Anesthesia, risks & benefits discussed:  Anesthesia Type:  general  Patient's Preference:   Intra-op Monitoring Plan: standard ASA monitors  Intra-op Monitoring Plan Comments:   Post Op Pain Control Plan: per primary service following discharge  from PACU, IV/PO Opioids PRN and multimodal analgesia  Post Op Pain Control Plan Comments:   Induction:   IV  Beta Blocker:  Patient is not currently on a Beta-Blocker (No further documentation required).       Informed Consent: Patient understands risks and agrees with Anesthesia plan.  Questions answered. Anesthesia consent signed with patient.  ASA Score: 2     Day of Surgery Review of History & Physical:        Anesthesia Plan Notes: PONV        Ready For Surgery From Anesthesia Perspective.

## 2017-08-21 NOTE — PRE ADMISSION SCREENING
Anesthesia Assessment: Preoperative EQUATION    Planned Procedure: Procedure(s) (LRB):  BIOPSY-LYMPH NODE (Left)  Requested Anesthesia Type:General  Surgeon: Bashir Olvera MD  Service: ENT  Known or anticipated Date of Surgery:8/25/2017    Surgeon notes: reviewed    Electronic QUestionnaire Assessment completed via nurse interview with patient.        NO AQ    Triage considerations:     The patient has no apparent active cardiac condition (No unstable coronary Syndrome such as severe unstable angina or recent [<1 month] myocardial infarction, decompensated CHF, severe valvular   disease or significant arrhythmia)    Previous anesthesia records:GETA, No problems and Not available    Last PCP note: within 3 months , within [facility]  6/27/17  Subspecialty notes: ENT    Other important co-morbidities: HX thyroid cancer, obesity     Tests already available:  Available tests,  within 3 months . 6/6/17 CBC, TSH, CMP            Instructions given. (See in Nurse's note)    Optimization:  Anesthesia Preop Clinic Assessment  Indicated-not required for this procedure       Plan:    Testing:  none     Patient  has previously scheduled Medical Appointment:none    Navigation:               Straight Line to surgery.               No tests, anesthesia preop clinic visit, or consult required.

## 2017-08-24 ENCOUNTER — TELEPHONE (OUTPATIENT)
Dept: OTOLARYNGOLOGY | Facility: CLINIC | Age: 32
End: 2017-08-24

## 2017-08-25 ENCOUNTER — HOSPITAL ENCOUNTER (OUTPATIENT)
Facility: HOSPITAL | Age: 32
Discharge: HOME | End: 2017-08-25
Attending: OTOLARYNGOLOGY | Admitting: OTOLARYNGOLOGY
Payer: MEDICAID

## 2017-08-25 ENCOUNTER — SURGERY (OUTPATIENT)
Age: 32
End: 2017-08-25

## 2017-08-25 ENCOUNTER — ANESTHESIA (OUTPATIENT)
Dept: SURGERY | Facility: HOSPITAL | Age: 32
End: 2017-08-25
Payer: MEDICAID

## 2017-08-25 DIAGNOSIS — R59.0 LYMPHADENOPATHY, CERVICAL: ICD-10-CM

## 2017-08-25 LAB
B-HCG UR QL: NEGATIVE
CTP QC/QA: YES
POCT GLUCOSE: 99 MG/DL (ref 70–110)

## 2017-08-25 PROCEDURE — 88184 FLOWCYTOMETRY/ TC 1 MARKER: CPT | Performed by: PATHOLOGY

## 2017-08-25 PROCEDURE — 82962 GLUCOSE BLOOD TEST: CPT | Performed by: OTOLARYNGOLOGY

## 2017-08-25 PROCEDURE — 25000003 PHARM REV CODE 250: Performed by: OTOLARYNGOLOGY

## 2017-08-25 PROCEDURE — 37000008 HC ANESTHESIA 1ST 15 MINUTES: Performed by: OTOLARYNGOLOGY

## 2017-08-25 PROCEDURE — 63600175 PHARM REV CODE 636 W HCPCS: Performed by: REGISTERED NURSE

## 2017-08-25 PROCEDURE — 25000003 PHARM REV CODE 250: Performed by: REGISTERED NURSE

## 2017-08-25 PROCEDURE — 88189 FLOWCYTOMETRY/READ 16 & >: CPT | Mod: ,,, | Performed by: PATHOLOGY

## 2017-08-25 PROCEDURE — D9220A PRA ANESTHESIA: Mod: CRNA,,, | Performed by: REGISTERED NURSE

## 2017-08-25 PROCEDURE — 88305 TISSUE EXAM BY PATHOLOGIST: CPT | Performed by: PATHOLOGY

## 2017-08-25 PROCEDURE — 38510 BIOPSY/REMOVAL LYMPH NODES: CPT | Mod: LT,,, | Performed by: OTOLARYNGOLOGY

## 2017-08-25 PROCEDURE — 88185 FLOWCYTOMETRY/TC ADD-ON: CPT | Mod: 59 | Performed by: PATHOLOGY

## 2017-08-25 PROCEDURE — 36000706: Performed by: OTOLARYNGOLOGY

## 2017-08-25 PROCEDURE — 88305 TISSUE EXAM BY PATHOLOGIST: CPT | Mod: 26,,, | Performed by: PATHOLOGY

## 2017-08-25 PROCEDURE — 36000707: Performed by: OTOLARYNGOLOGY

## 2017-08-25 PROCEDURE — 63600175 PHARM REV CODE 636 W HCPCS: Performed by: OTOLARYNGOLOGY

## 2017-08-25 PROCEDURE — 88341 IMHCHEM/IMCYTCHM EA ADD ANTB: CPT | Mod: 26,,, | Performed by: PATHOLOGY

## 2017-08-25 PROCEDURE — 25000003 PHARM REV CODE 250: Performed by: ANESTHESIOLOGY

## 2017-08-25 PROCEDURE — 25000003 PHARM REV CODE 250: Performed by: STUDENT IN AN ORGANIZED HEALTH CARE EDUCATION/TRAINING PROGRAM

## 2017-08-25 PROCEDURE — 81025 URINE PREGNANCY TEST: CPT | Performed by: OTOLARYNGOLOGY

## 2017-08-25 PROCEDURE — 71000033 HC RECOVERY, INTIAL HOUR: Performed by: OTOLARYNGOLOGY

## 2017-08-25 PROCEDURE — 37000009 HC ANESTHESIA EA ADD 15 MINS: Performed by: OTOLARYNGOLOGY

## 2017-08-25 PROCEDURE — 71000015 HC POSTOP RECOV 1ST HR: Performed by: OTOLARYNGOLOGY

## 2017-08-25 PROCEDURE — 88342 IMHCHEM/IMCYTCHM 1ST ANTB: CPT | Mod: 26,,, | Performed by: PATHOLOGY

## 2017-08-25 PROCEDURE — D9220A PRA ANESTHESIA: Mod: ANES,,, | Performed by: ANESTHESIOLOGY

## 2017-08-25 PROCEDURE — 71000039 HC RECOVERY, EACH ADD'L HOUR: Performed by: OTOLARYNGOLOGY

## 2017-08-25 RX ORDER — ROCURONIUM BROMIDE 10 MG/ML
INJECTION, SOLUTION INTRAVENOUS
Status: DISCONTINUED | OUTPATIENT
Start: 2017-08-25 | End: 2017-08-25

## 2017-08-25 RX ORDER — FENTANYL CITRATE 50 UG/ML
INJECTION, SOLUTION INTRAMUSCULAR; INTRAVENOUS
Status: DISCONTINUED | OUTPATIENT
Start: 2017-08-25 | End: 2017-08-25

## 2017-08-25 RX ORDER — HYDROMORPHONE HYDROCHLORIDE 1 MG/ML
0.2 INJECTION, SOLUTION INTRAMUSCULAR; INTRAVENOUS; SUBCUTANEOUS EVERY 5 MIN PRN
Status: DISCONTINUED | OUTPATIENT
Start: 2017-08-25 | End: 2017-08-25 | Stop reason: HOSPADM

## 2017-08-25 RX ORDER — LIDOCAINE HYDROCHLORIDE 10 MG/ML
1 INJECTION, SOLUTION EPIDURAL; INFILTRATION; INTRACAUDAL; PERINEURAL ONCE
Status: DISCONTINUED | OUTPATIENT
Start: 2017-08-25 | End: 2017-08-25 | Stop reason: HOSPADM

## 2017-08-25 RX ORDER — SODIUM CHLORIDE 0.9 % (FLUSH) 0.9 %
3 SYRINGE (ML) INJECTION
Status: DISCONTINUED | OUTPATIENT
Start: 2017-08-25 | End: 2017-08-25 | Stop reason: HOSPADM

## 2017-08-25 RX ORDER — SODIUM CHLORIDE 9 MG/ML
INJECTION, SOLUTION INTRAVENOUS CONTINUOUS
Status: DISCONTINUED | OUTPATIENT
Start: 2017-08-25 | End: 2017-08-25 | Stop reason: HOSPADM

## 2017-08-25 RX ORDER — ONDANSETRON 8 MG/1
8 TABLET, ORALLY DISINTEGRATING ORAL EVERY 8 HOURS PRN
Qty: 15 TABLET | Refills: 0 | Status: SHIPPED | OUTPATIENT
Start: 2017-08-25 | End: 2019-06-17 | Stop reason: SDUPTHER

## 2017-08-25 RX ORDER — KETOROLAC TROMETHAMINE 30 MG/ML
INJECTION, SOLUTION INTRAMUSCULAR; INTRAVENOUS
Status: DISCONTINUED | OUTPATIENT
Start: 2017-08-25 | End: 2017-08-25

## 2017-08-25 RX ORDER — OXYCODONE AND ACETAMINOPHEN 10; 325 MG/1; MG/1
1 TABLET ORAL EVERY 4 HOURS PRN
Qty: 20 TABLET | Refills: 0 | Status: SHIPPED | OUTPATIENT
Start: 2017-08-25 | End: 2017-08-28 | Stop reason: SDUPTHER

## 2017-08-25 RX ORDER — PROPOFOL 10 MG/ML
VIAL (ML) INTRAVENOUS
Status: DISCONTINUED | OUTPATIENT
Start: 2017-08-25 | End: 2017-08-25

## 2017-08-25 RX ORDER — SUCCINYLCHOLINE CHLORIDE 20 MG/ML
INJECTION INTRAMUSCULAR; INTRAVENOUS
Status: DISCONTINUED | OUTPATIENT
Start: 2017-08-25 | End: 2017-08-25

## 2017-08-25 RX ORDER — MIDAZOLAM HYDROCHLORIDE 1 MG/ML
INJECTION, SOLUTION INTRAMUSCULAR; INTRAVENOUS
Status: DISCONTINUED | OUTPATIENT
Start: 2017-08-25 | End: 2017-08-25

## 2017-08-25 RX ORDER — LIDOCAINE HCL/PF 100 MG/5ML
SYRINGE (ML) INTRAVENOUS
Status: DISCONTINUED | OUTPATIENT
Start: 2017-08-25 | End: 2017-08-25

## 2017-08-25 RX ORDER — FERROUS GLUCONATE 324(38)MG
324 TABLET ORAL
COMMUNITY
End: 2018-07-16

## 2017-08-25 RX ORDER — OXYCODONE AND ACETAMINOPHEN 10; 325 MG/1; MG/1
1 TABLET ORAL EVERY 4 HOURS PRN
Status: DISCONTINUED | OUTPATIENT
Start: 2017-08-25 | End: 2017-08-25 | Stop reason: HOSPADM

## 2017-08-25 RX ORDER — LIDOCAINE HYDROCHLORIDE AND EPINEPHRINE 20; 10 MG/ML; UG/ML
INJECTION, SOLUTION INFILTRATION; PERINEURAL
Status: DISCONTINUED | OUTPATIENT
Start: 2017-08-25 | End: 2017-08-25 | Stop reason: HOSPADM

## 2017-08-25 RX ORDER — LIDOCAINE HYDROCHLORIDE 10 MG/ML
1 INJECTION, SOLUTION EPIDURAL; INFILTRATION; INTRACAUDAL; PERINEURAL ONCE
Status: COMPLETED | OUTPATIENT
Start: 2017-08-25 | End: 2017-08-25

## 2017-08-25 RX ORDER — ONDANSETRON 2 MG/ML
INJECTION INTRAMUSCULAR; INTRAVENOUS
Status: DISCONTINUED | OUTPATIENT
Start: 2017-08-25 | End: 2017-08-25

## 2017-08-25 RX ORDER — DEXAMETHASONE SODIUM PHOSPHATE 4 MG/ML
INJECTION, SOLUTION INTRA-ARTICULAR; INTRALESIONAL; INTRAMUSCULAR; INTRAVENOUS; SOFT TISSUE
Status: DISCONTINUED | OUTPATIENT
Start: 2017-08-25 | End: 2017-08-25

## 2017-08-25 RX ADMIN — PROPOFOL 150 MG: 10 INJECTION, EMULSION INTRAVENOUS at 01:08

## 2017-08-25 RX ADMIN — LIDOCAINE HYDROCHLORIDE 100 MG: 20 INJECTION, SOLUTION INTRAVENOUS at 01:08

## 2017-08-25 RX ADMIN — ROCURONIUM BROMIDE 10 MG: 10 INJECTION, SOLUTION INTRAVENOUS at 01:08

## 2017-08-25 RX ADMIN — SODIUM CHLORIDE, SODIUM GLUCONATE, SODIUM ACETATE, POTASSIUM CHLORIDE, MAGNESIUM CHLORIDE, SODIUM PHOSPHATE, DIBASIC, AND POTASSIUM PHOSPHATE: .53; .5; .37; .037; .03; .012; .00082 INJECTION, SOLUTION INTRAVENOUS at 02:08

## 2017-08-25 RX ADMIN — EPHEDRINE SULFATE 15 MG: 50 INJECTION, SOLUTION INTRAMUSCULAR; INTRAVENOUS; SUBCUTANEOUS at 01:08

## 2017-08-25 RX ADMIN — MIDAZOLAM HYDROCHLORIDE 2 MG: 1 INJECTION, SOLUTION INTRAMUSCULAR; INTRAVENOUS at 01:08

## 2017-08-25 RX ADMIN — KETOROLAC TROMETHAMINE 30 MG: 30 INJECTION, SOLUTION INTRAMUSCULAR; INTRAVENOUS at 01:08

## 2017-08-25 RX ADMIN — EPHEDRINE SULFATE 20 MG: 50 INJECTION, SOLUTION INTRAMUSCULAR; INTRAVENOUS; SUBCUTANEOUS at 02:08

## 2017-08-25 RX ADMIN — FENTANYL CITRATE 100 MCG: 50 INJECTION, SOLUTION INTRAMUSCULAR; INTRAVENOUS at 01:08

## 2017-08-25 RX ADMIN — Medication 2 G: at 01:08

## 2017-08-25 RX ADMIN — DEXAMETHASONE SODIUM PHOSPHATE 8 MG: 4 INJECTION, SOLUTION INTRAMUSCULAR; INTRAVENOUS at 01:08

## 2017-08-25 RX ADMIN — ONDANSETRON 4 MG: 2 INJECTION INTRAMUSCULAR; INTRAVENOUS at 01:08

## 2017-08-25 RX ADMIN — SODIUM CHLORIDE: 0.9 INJECTION, SOLUTION INTRAVENOUS at 11:08

## 2017-08-25 RX ADMIN — LIDOCAINE HYDROCHLORIDE 10 MG: 10 INJECTION, SOLUTION EPIDURAL; INFILTRATION; INTRACAUDAL; PERINEURAL at 11:08

## 2017-08-25 RX ADMIN — SUCCINYLCHOLINE CHLORIDE 100 MG: 20 INJECTION, SOLUTION INTRAMUSCULAR; INTRAVENOUS at 01:08

## 2017-08-25 RX ADMIN — OXYCODONE HYDROCHLORIDE AND ACETAMINOPHEN 1 TABLET: 10; 325 TABLET ORAL at 02:08

## 2017-08-25 RX ADMIN — LIDOCAINE HYDROCHLORIDE AND EPINEPHRINE 1 ML: 20; 10 INJECTION, SOLUTION INFILTRATION; PERINEURAL at 01:08

## 2017-08-25 NOTE — H&P (VIEW-ONLY)
Chief Complaint   Patient presents with    Enlarged lymphnode     Treatment History  9/28/15: Total thyroidectomy for [id number] papillary thyroid carcinoma ([facility]).    HPI   32 y.o. female presents with the above treatment history.   She was seen at [facility] where she underwent FNA of a L cervical node.  Path revealed lymphocytes.  She reports persistent L neck and facial pain which has been present for mos.  This pain is localized to the L preauricular region.  She also reports associated pain above her L eye.  CT neck revealed mildly enlarged bilateral level II nodes.     Review of Systems   Constitutional: Negative for fatigue and unexpected weight change.   HENT: Per HPI.  Eyes: Negative for visual disturbance.   Respiratory: Negative for shortness of breath, hemoptysis   Cardiovascular: Negative for chest pain and palpitations.   Musculoskeletal: Negative for decreased ROM, back pain.   Skin: Negative for rash, sunburn, itching.   Neurological: Negative for dizziness and seizures.   Hematological: Negative for adenopathy. Does not bruise/bleed easily.   Endocrine: Negative for rapid weight loss/weight gain, heat/cold intolerance.     Past Medical History   Patient Active Problem List   Diagnosis    Neck pain    Pharyngitis    Thrombosed external hemorrhoid    Goiter    Rhinitis    URI (upper respiratory infection)    Anxiety and depression    Fatigue    Perineal rash in female    Allergic rhinitis    Hx of papillary thyroid carcinoma    H/O thyroidectomy    .    Postoperative hypothyroidism    Mastitis    Pyelonephritis    Malaise                       Past Surgical History   Thyroidectomy  Gastric sleeve   x 3      Family History   Thyroid cancer        Social History   .  Social History     Social History    Marital status:      Spouse name: N/A    Number of children: N/A    Years of education: N/A     Occupational History    Not on file.     Social History Main  Topics    Smoking status: Former Smoker     Packs/day: 0.50     Years: 3.00    Smokeless tobacco: Not on file    Alcohol use No    Drug use: Unknown    Sexual activity: Not on file     Other Topics Concern    Not on file     Social History Narrative    No narrative on file         Allergies   No Known Allergies        Physical Exam     Vitals:    08/14/17 0839   BP: 110/64   Pulse: 77         Body mass index is 35.52 kg/m².      General: AOx3, NAD   Right Ear: External Auditory Canal WNL,TM w/o masses/lesions/perforations.  Middle ear without evidence of effusion.   Left Ear: External Auditory Canal WNL,TM w/o masses/lesions/perforations.  Middle ear without evidence of effusion.   Nose: No gross nasal septal deviation. Inferior Turbinates WNL bilaterally. No septal perforation. No masses/lesions.   Oral Cavity:  Oral Tongue mobile, no lesions noted. Hard Palate WNL. No buccal or FOM lesions.  Oropharynx:  No masses/lesions of the posterior pharyngeal wall. Tonsillar fossa without lesions. Soft palate without masses. Midline uvula.   Neck: Well-healed thyroidectomy scar.  Small L level II node. No thyromegaly or thyroid nodules.    Face: House Brackmann I bilaterally.     CT neck: reviewed    Assessment   1. Lymphadenopathy, cervical    2. Hx of papillary thyroid carcinoma        Plan   32 y.o. female with [id number] papillary thyroid carcinoma status post total thyroidectomy in 9/15.  YG clinically.  FNA L neck mass revealed lymphocytes.  She wishes to undergo ex bx.  I explained that the primary objective of this operation is to rule out lymphoma.  She understands that her pain is unlikely to improve after this operation.    I explained the benefits of this procedure would be a diagnosis and elimination of the lesion.  The indication for surgery is the presence of a neck mass.  We discussed that the chief alternatives is observation, with potential for excisional biopsy if there was ever significant change.   The patient is interested in undergoing the procedure. The risks of left deep cervical lymph node excisional biopsy include, but are not limited to, infection, bleeding, scarring, weakness of the shoulder due to injury to the spinal accessory nerve, weakness of the lip due to injury to the marginal mandibular nerve, collection of blood or tissue fluid under the skin requiring drainage, failure to achieve the diagnosis, and the need for additional procedures.  Time was allowed for questions, and all questions were answered to the patient's apparent satisfaction.      Surgery is scheduled on 8/25/17.

## 2017-08-25 NOTE — ANESTHESIA POSTPROCEDURE EVALUATION
Anesthesia Post Evaluation    Patient: Monique Rai    Procedure(s) Performed: Procedure(s) (LRB):  BIOPSY-LYMPH NODE (Left)    Final Anesthesia Type: general  Patient location during evaluation: PACU  Patient participation: Yes- Able to Participate  Level of consciousness: awake  Post-procedure vital signs: reviewed and stable  Pain management: adequate  Airway patency: patent  PONV status at discharge: No PONV  Anesthetic complications: no      Cardiovascular status: hemodynamically stable and blood pressure returned to baseline  Respiratory status: unassisted, spontaneous ventilation and face mask  Hydration status: euvolemic  Follow-up not needed.        Visit Vitals  /67 (BP Location: Right arm, Patient Position: Lying)   Pulse 103   Temp 36.1 °C (97 °F) (Axillary)   Resp 16   Ht 5' (1.524 m)   Wt 86.2 kg (190 lb)   LMP 08/23/2017   SpO2 100%   Breastfeeding? No   BMI 37.11 kg/m²       Pain/Burke Score: Pain Assessment Performed: Yes (8/25/2017 11:08 AM)  Presence of Pain: complains of pain/discomfort (8/25/2017 11:08 AM)

## 2017-08-25 NOTE — PLAN OF CARE
Plan of care reviewed with mother and fiance. Informed of projected surgery start time, length of surgery. Also informed, of current delay. Verbalization of understanding. Needs: surgery consent, anes consent, site mag, and h&p update needed. Will cont to monitor.

## 2017-08-25 NOTE — BRIEF OP NOTE
[facility]  Brief Operative Note     SUMMARY     Surgery Date: 8/25/2017     Surgeon(s) and Role:     * Shivam Holman Jr., MD - Resident - Assisting     * Bashir Olvera MD - Primary        Pre-op Diagnosis:  Lymphadenopathy, cervical [R59.0]    Post-op Diagnosis:  Post-Op Diagnosis Codes:     * Lymphadenopathy, cervical [R59.0]    Procedure(s) (LRB):  BIOPSY-LYMPH NODE (Left)    Anesthesia: General    Description of the findings of the procedure: see op note    Findings/Key Components: submandiblar lymph node    Estimated Blood Loss: * No values recorded between 8/25/2017  1:30 PM and 8/25/2017  2:21 PM *         Specimens:   Specimen (12h ago through future)    Start     Ordered    08/25/17 1358  Specimen to Pathology - Surgery  Once     Comments:  1.) Left level 2 lymph node- FRESH SENT FOR FLOW CYTOMETRY.      08/25/17 1358          Discharge Note    SUMMARY     Admit Date: 8/25/2017    Discharge Date and Time:  08/25/2017 2:21 PM    Hospital Course Following completion of an electively scheduled procedure, she was transferred to the PACU for postoperative monitoring. her hospital course was uneventful and noted for adequate pain control and PO intake following surgery. she is discharged home in good condition and will follow-up with Humera Marie NP in 1 week.    Final Diagnosis: Post-Op Diagnosis Codes:     * Lymphadenopathy, cervical [R59.0]    Disposition: Home or Self Care    Follow Up/Patient Instructions:     Medications:  Reconciled Home Medications:   Current Discharge Medication List      START taking these medications    Details   ondansetron (ZOFRAN-ODT) 8 MG TbDL Take 1 tablet (8 mg total) by mouth every 8 (eight) hours as needed.  Qty: 15 tablet, Refills: 0      oxycodone-acetaminophen (PERCOCET)  mg per tablet Take 1 tablet by mouth every 4 (four) hours as needed for Pain.  Qty: 20 tablet, Refills: 0         CONTINUE these medications which have NOT CHANGED     Details   SARAH THYROID 180 mg Tab TK 1 T PO QAM BEFORE BREAKFAST  Refills: 11      ascorbic acid (VITAMIN C) 1000 MG tablet Take 1,000 mg by mouth once daily.      cholecalciferol, vitamin D3, 50,000 unit capsule Take 1 capsule (50,000 Units total) by mouth every 7 days.  Qty: 6 capsule, Refills: 0    Comments: One tablet PO every 7th day for 6 weeks.  Associated Diagnoses: Vitamin D deficiency      cyanocobalamin 1,000 mcg/mL injection INJECT 1ML SUBQ ONCE DAILY FOR 7 DAYS, THEN ONCE WEEKLY FOR 4 WEEKS THEN ONCE MONTHLY  Qty: 11 mL, Refills: 0      CYANOCOBALAMIN, VITAMIN B-12, (B-12 KIT INJ) Inject as directed every 30 days.      escitalopram oxalate (LEXAPRO) 20 MG tablet Take 1 tablet (20 mg total) by mouth once daily.  Qty: 30 tablet, Refills: 11    Associated Diagnoses: Depression with anxiety      ferrous gluconate (FERGON) 324 MG tablet Take 324 mg by mouth daily with breakfast.      hydrOXYzine HCl (ATARAX) 25 MG tablet Take 1 tablet (25 mg total) by mouth 3 (three) times daily.  Qty: 90 tablet, Refills: 1    Associated Diagnoses: Pruritus      ondansetron (ZOFRAN) 4 MG tablet Take 1 tablet (4 mg total) by mouth every 6 (six) hours as needed.  Qty: 30 tablet, Refills: 0    Associated Diagnoses: Nausea      fluticasone (FLONASE) 50 mcg/actuation nasal spray SPRAY ONCE IN EACH NOSTRIL EVERY DAY  Qty: 1 Bottle, Refills: 6    Associated Diagnoses: Sinusitis, unspecified chronicity, unspecified location             Discharge Procedure Orders  Diet general     Call MD for:  temperature >100.4     Call MD for:  persistent nausea and vomiting or diarrhea     Call MD for:  severe uncontrolled pain     Call MD for:  redness, tenderness, or signs of infection (pain, swelling, redness, odor or green/yellow discharge around incision site)     Call MD for:  difficulty breathing or increased cough     Call MD for:  severe persistent headache       Follow-up Information     Humera Marie NP In 1 week.    Specialty:   Otolaryngology  Contact information:  [address]  [facility] [address]  [phone]

## 2017-08-25 NOTE — PLAN OF CARE
Problem: Patient Care Overview  Goal: Plan of Care Review  Outcome: Outcome(s) achieved Date Met: 08/25/17  Awake and alert. VSS. Denies nausea.Pain is tolerable. Tolerating liquids well. Voiding well. DC instructions given to patient and family and they verbalize understanding.

## 2017-08-25 NOTE — OP NOTE
DATE OF PROCEDURE:  08/25/2017    PREOPERATIVE DIAGNOSES:  1.  Left cervical lymphadenopathy.  2.  History of papillary thyroid carcinoma.    POSTOPERATIVE DIAGNOSES:  1.  Left cervical lymphadenopathy.  2.  History of papillary thyroid carcinoma.    PROCEDURE:  Excisional biopsy, left level 2 lymph node.    SURGEON:  Bashir Olvera M.D.    ASSISTANT:  Shivam Holman.    ANESTHESIA:  General endotracheal.    ESTIMATED BLOOD LOSS:  Minimal.    SPECIMENS:  Left level 2 lymph node for permanent.  Care was taken to send it   fresh so that flow cytometry could be performed.    INDICATIONS FOR PROCEDURE:  Ms. Rai is a 32-year-old woman with a history of   papillary thyroid carcinoma treated with total thyroidectomy in 2015.  She   recently presented to me with complaint of a prominent left cervical lymph node.    She reported pain in the region.  She underwent ultrasound-guided fine needle   aspiration, which only revealed lymphocytes.  She subsequently underwent CT scan   of the neck, which revealed mildly enlarged bilateral level 2 lymph nodes.  She   expressed a desire to undergo excision of her left level 2 lymph node.  I made   it clear to her, however, that this operation may not resolve her neck pain.    She expressed understanding.  She was apprised of the risks, benefits and   alternatives to surgery.  In spite of the risks inherent to surgery, she   provided informed consent.    PROCEDURE IN DETAIL:  The patient was taken to the Operating Room and placed on   the operating table in supine position.  General endotracheal anesthesia was   induced by the Anesthesia Team.  The left neck was addressed.  An approximately   3 cm incision was marked overlying the left level 2.  It was injected with   several mL of 1% lidocaine with epinephrine.  The left neck was then prepped and   draped in standard sterile fashion.    To begin, a #15 blade was utilized to incise the skin with sharp dissection   proceeding  through the underlying subcutaneous tissue and platysmal muscle.  A   superiorly based subplatysmal flap was elevated up to the level of the mandible.    The flaps were retracted utilizing self-retaining hooks.  The anterior border   of the sternocleidomastoid muscle was identified and skeletonized.  Blunt   dissection proceeded down into level 2 where the facial vein and submandibular   gland were identified.  The posterior aspect of the submandibular gland was   identified slightly enlarged lymph node.  It was circumferentially dissected and   freed of all soft tissue attachments.  Hemostasis was achieved with   electrocautery.  Once it was completely amputated, the specimen was sent to   Pathology for permanent analysis.  Care was taken to send it fresh so that flow   cytometry could be performed.  Hemostasis was achieved with bipolar and the   wound was closed utilizing 3-0 Vicryl, 4-0 Monocryl and Dermabond.  Once the   wound was closed, the patient was handed back to Anesthesia, awakened, extubated   and transported to Recovery in satisfactory condition.  There were no   intraoperative complications.  I was present and participated in the entire   procedure.      CPH/MATTHEW  dd: 08/25/2017 18:32:44 (CDT)  td: 08/25/2017 18:47:46 (CDT)  Doc ID   #[id number]  Job ID #[id number]    CC:

## 2017-08-25 NOTE — INTERVAL H&P NOTE
The patient has been examined and the H&P has been reviewed:    I concur with the findings and no changes have occurred since H&P was written.    Anesthesia/Surgery risks, benefits and alternative options discussed and understood by patient/family.          Active Hospital Problems    Diagnosis  POA    Lymphadenopathy, cervical [R59.0]  Yes      Resolved Hospital Problems    Diagnosis Date Resolved POA   No resolved problems to display.

## 2017-08-25 NOTE — TRANSFER OF CARE
Anesthesia Transfer of Care Note    Patient: Monique Rai    Procedure(s) Performed: Procedure(s) (LRB):  BIOPSY-LYMPH NODE (Left)    Patient location: PACU    Anesthesia Type: general    Transport from OR: Transported from OR on 6-10 L/min O2 by face mask with adequate spontaneous ventilation    Post pain: adequate analgesia    Post assessment: no apparent anesthetic complications    Post vital signs: stable    Level of consciousness: awake    Nausea/Vomiting: no nausea/vomiting    Complications: none    Transfer of care protocol was followed      Last vitals:   Visit Vitals  /70 (BP Location: Left arm, Patient Position: Lying)   Pulse 65   Temp 36.8 °C (98.2 °F) (Oral)   Resp 16   Ht 5' (1.524 m)   Wt 86.2 kg (190 lb)   LMP 08/23/2017   Breastfeeding? No   BMI 37.11 kg/m²

## 2017-08-27 ENCOUNTER — NURSE TRIAGE (OUTPATIENT)
Dept: ADMINISTRATIVE | Facility: CLINIC | Age: 32
End: 2017-08-27

## 2017-08-27 NOTE — TELEPHONE ENCOUNTER
"  Reason for Disposition   [1] SEVERE post-op pain (e.g., excruciating, pain scale 8-10) AND [2] not controlled with pain medications    Answer Assessment - Initial Assessment Questions  1. SYMPTOM: "What's the main symptom you're concerned about?" (e.g., pain, fever, vomiting)      Pain,swelling  2. ONSET: "When did ________  start?"      At home last night  3. SURGERY: "What surgery was performed?"      lymp node removed  4. DATE of SURGERY: "When was surgery performed?"       8/25  5. ANESTHESIA: " What type of anesthesia did you have?" (e.g., general, spinal, epidural, local)      general  6. PAIN: "Is there any pain?" If so, ask: "How bad is it?"  (Scale 1-10; or mild, moderate, severe)      severe  7. FEVER: "Do you have a fever?" If so, ask: "What is your temperature, how was it measured, and when did it start?"      no  8. VOMITING: "Is there any vomiting?" If yes, ask: "How many times?"      no  9. BLEEDING: "Is there any bleeding?" If so, ask: "How much?" and "Where?"      no  10. OTHER SYMPTOMS: "Do you have any other symptoms?" (e.g., drainage from wound, painful urination, constipation)        no    Protocols used: ST POST-OP SYMPTOMS AND [id number]    "

## 2017-08-28 VITALS
HEART RATE: 88 BPM | SYSTOLIC BLOOD PRESSURE: 117 MMHG | RESPIRATION RATE: 18 BRPM | HEIGHT: 60 IN | TEMPERATURE: 98 F | DIASTOLIC BLOOD PRESSURE: 73 MMHG | BODY MASS INDEX: 37.3 KG/M2 | OXYGEN SATURATION: 99 % | WEIGHT: 190 LBS

## 2017-08-28 LAB
FLOW CYTOMETRY ANTIBODIES ANALYZED - LYMPH NODE: NORMAL
FLOW CYTOMETRY COMMENT - LYMPH NODE: NORMAL
FLOW CYTOMETRY INTERPRETATION - LYMPH NODE: NORMAL

## 2017-08-28 RX ORDER — OXYCODONE AND ACETAMINOPHEN 10; 325 MG/1; MG/1
1 TABLET ORAL EVERY 4 HOURS PRN
Qty: 30 TABLET | Refills: 0 | Status: SHIPPED | OUTPATIENT
Start: 2017-08-28 | End: 2017-09-18

## 2017-08-30 NOTE — PROGRESS NOTES
No chief complaint on file.    Treatment History  9/28/15: Total thyroidectomy for [id number] papillary thyroid carcinoma ([facility]).  17: Left neck lymph node biopsy ()     HPI   32 y.o. female presents with the above treatment history. Her only complaint today is pain to her incision. She is taking Percocet with moderate relief noted. She is breathing and swallowing comfortably.     She was seen at [facility] where she underwent FNA of a L cervical node.  Path revealed lymphocytes.  She reports persistent L neck and facial pain which has been present for mos.  This pain is localized to the L preauricular region.  She also reports associated pain above her L eye.  CT neck revealed mildly enlarged bilateral level II nodes.     Review of Systems   Constitutional: Negative for fatigue and unexpected weight change.   HENT: Per HPI.  Eyes: Negative for visual disturbance.   Respiratory: Negative for shortness of breath, hemoptysis   Cardiovascular: Negative for chest pain and palpitations.   Musculoskeletal: Negative for decreased ROM, back pain.   Skin: Negative for rash, sunburn, itching.   Neurological: Negative for dizziness and seizures.   Hematological: Negative for adenopathy. Does not bruise/bleed easily.   Endocrine: Negative for rapid weight loss/weight gain, heat/cold intolerance.     Past Medical History   Patient Active Problem List   Diagnosis    Neck pain    Pharyngitis    Thrombosed external hemorrhoid    Rhinitis    URI (upper respiratory infection)    Anxiety and depression    Fatigue    Perineal rash in female    Allergic rhinitis    Hx of papillary thyroid carcinoma    H/O thyroidectomy    .    Postoperative hypothyroidism    Mastitis    Pyelonephritis    Malaise    Lymphadenopathy, cervical                       Past Surgical History   Thyroidectomy  Gastric sleeve   x 3      Family History   Thyroid cancer        Social History   .  Social History     Social  History    Marital status:      Spouse name: N/A    Number of children: N/A    Years of education: N/A     Occupational History    Not on file.     Social History Main Topics    Smoking status: Former Smoker     Packs/day: 0.50     Years: 3.00    Smokeless tobacco: Never Used    Alcohol use No    Drug use: No    Sexual activity: Not on file     Other Topics Concern    Not on file     Social History Narrative    No narrative on file         Allergies   No Known Allergies        Physical Exam     There were no vitals filed for this visit.      There is no height or weight on file to calculate BMI.      General: AOx3, NAD   Neck: Well-healed thyroidectomy scar.  Incision CDI.  No redness, drainage, or fluid collection noted.  Edges well approximated.  Face: House Brackmann I bilaterally.     CT neck: reviewed    FINAL PATHOLOGIC DIAGNOSIS  LYMPH NODE, LEFT LEVEL II, EXCISIONAL BIOPSY:  --Follicular and paracortical hyperplasia (reactive lymph node), see comment.  COMMENT: The corresponding flow cytometric analysis (please see separate report) does not detect an abnormal  hematopoietic population.    Assessment   1. Hx of papillary thyroid carcinoma    2. Lymphadenopathy, cervical        Plan   32 y.o. female with [id number] papillary thyroid carcinoma status post total thyroidectomy in 9/15 and now 6 days s/p lymph node biopsy. She is healing well. We discussed her path report. She may RTC prn.

## 2017-08-31 ENCOUNTER — OFFICE VISIT (OUTPATIENT)
Dept: OTOLARYNGOLOGY | Facility: CLINIC | Age: 32
End: 2017-08-31
Payer: MEDICAID

## 2017-08-31 VITALS
SYSTOLIC BLOOD PRESSURE: 118 MMHG | BODY MASS INDEX: 37.42 KG/M2 | HEART RATE: 82 BPM | DIASTOLIC BLOOD PRESSURE: 77 MMHG | WEIGHT: 191.56 LBS

## 2017-08-31 DIAGNOSIS — R59.0 LYMPHADENOPATHY, CERVICAL: ICD-10-CM

## 2017-08-31 DIAGNOSIS — Z85.850 HX OF PAPILLARY THYROID CARCINOMA: Primary | ICD-10-CM

## 2017-08-31 PROCEDURE — 99213 OFFICE O/P EST LOW 20 MIN: CPT | Mod: PBBFAC | Performed by: NURSE PRACTITIONER

## 2017-08-31 PROCEDURE — 99024 POSTOP FOLLOW-UP VISIT: CPT | Mod: ,,, | Performed by: NURSE PRACTITIONER

## 2017-08-31 PROCEDURE — 99999 PR PBB SHADOW E&M-EST. PATIENT-LVL III: CPT | Mod: PBBFAC,,, | Performed by: NURSE PRACTITIONER

## 2017-09-18 ENCOUNTER — OFFICE VISIT (OUTPATIENT)
Dept: FAMILY MEDICINE | Facility: HOSPITAL | Age: 32
End: 2017-09-18
Attending: FAMILY MEDICINE
Payer: MEDICAID

## 2017-09-18 VITALS
DIASTOLIC BLOOD PRESSURE: 74 MMHG | HEART RATE: 83 BPM | WEIGHT: 195.75 LBS | SYSTOLIC BLOOD PRESSURE: 111 MMHG | BODY MASS INDEX: 38.43 KG/M2 | HEIGHT: 60 IN

## 2017-09-18 DIAGNOSIS — R53.83 FATIGUE, UNSPECIFIED TYPE: ICD-10-CM

## 2017-09-18 DIAGNOSIS — M54.2 NECK PAIN: Primary | ICD-10-CM

## 2017-09-18 DIAGNOSIS — Z85.850 HX OF PAPILLARY THYROID CARCINOMA: ICD-10-CM

## 2017-09-18 DIAGNOSIS — E03.9 HYPOTHYROIDISM, UNSPECIFIED TYPE: ICD-10-CM

## 2017-09-18 PROCEDURE — 99213 OFFICE O/P EST LOW 20 MIN: CPT | Performed by: FAMILY MEDICINE

## 2017-09-18 NOTE — PROGRESS NOTES
Subjective:       Patient ID: Monique Rai is a 32 y.o. female.    Chief Complaint: Medication Refill    Medication Refill   Pertinent negatives include no arthralgias, chest pain, chills, congestion, coughing, fever, headaches, myalgias, nausea, rash, sore throat or vomiting.     Review of Systems   Constitutional: Negative for chills and fever.   HENT: Negative for congestion and sore throat.    Eyes: Negative for pain and redness.   Respiratory: Negative for cough and shortness of breath.    Cardiovascular: Negative for chest pain and palpitations.   Gastrointestinal: Negative for diarrhea, nausea and vomiting.   Genitourinary: Negative for difficulty urinating and dysuria.   Musculoskeletal: Negative for arthralgias and myalgias.   Skin: Negative for rash.   Neurological: Negative for syncope and headaches.   Psychiatric/Behavioral: Negative for agitation and confusion.       Objective:      Vitals:    09/18/17 1408   BP: 111/74   Pulse: 83     Physical Exam   Constitutional: She is oriented to person, place, and time. She appears well-developed and well-nourished. No distress.   HENT:   Head: Normocephalic and atraumatic.   Right Ear: External ear normal.   Left Ear: External ear normal.   Eyes: Conjunctivae are normal. Pupils are equal, round, and reactive to light. Right eye exhibits no discharge. Left eye exhibits no discharge.   Neck: Normal range of motion. Neck supple.   Cardiovascular: Normal rate and regular rhythm.  Exam reveals no gallop and no friction rub.    No murmur heard.  Pulmonary/Chest: Effort normal and breath sounds normal. No respiratory distress. She has no wheezes. She has no rales.   Abdominal: Soft. Bowel sounds are normal. She exhibits no distension. There is no tenderness.   Musculoskeletal: Normal range of motion.   Neurological: She is alert and oriented to person, place, and time. She has normal reflexes.   Skin: Skin is warm. No rash noted. No erythema.   Psychiatric: She  has a normal mood and affect. Her behavior is normal.       Assessment:       1. Neck pain    2. Hx of papillary thyroid carcinoma    3. Fatigue, unspecified type    4. Hypothyroidism, unspecified type        Plan:       Neck pain    Hx of papillary thyroid carcinoma  -     T4; Future; Expected date: 09/18/2017  -     TSH; Future; Expected date: 09/18/2017  -     Thyroglobulin; Future; Expected date: 09/18/2017    Fatigue, unspecified type    Hypothyroidism, unspecified type    Encouraged to follow-up with Dr. Olvera for possible seroma vs. Complication of LN dissection. Encouraged to follow-up with endocrinology at [facility], patient states former doctor left. Will f/u labs. Nexplanon placement at next visit.     Return in about 1 month (around 10/18/2017) for Nexplanon Placement (specific appointment).

## 2017-09-19 ENCOUNTER — OFFICE VISIT (OUTPATIENT)
Dept: OTOLARYNGOLOGY | Facility: CLINIC | Age: 32
End: 2017-09-19
Payer: MEDICAID

## 2017-09-19 VITALS — SYSTOLIC BLOOD PRESSURE: 121 MMHG | DIASTOLIC BLOOD PRESSURE: 71 MMHG | HEART RATE: 68 BPM

## 2017-09-19 DIAGNOSIS — R59.0 LYMPHADENOPATHY, CERVICAL: Primary | ICD-10-CM

## 2017-09-19 PROCEDURE — 99999 PR PBB SHADOW E&M-EST. PATIENT-LVL III: CPT | Mod: PBBFAC,,, | Performed by: NURSE PRACTITIONER

## 2017-09-19 PROCEDURE — 99024 POSTOP FOLLOW-UP VISIT: CPT | Mod: ,,, | Performed by: NURSE PRACTITIONER

## 2017-09-19 PROCEDURE — 99213 OFFICE O/P EST LOW 20 MIN: CPT | Mod: PBBFAC | Performed by: NURSE PRACTITIONER

## 2017-09-19 RX ORDER — DOXYCYCLINE 100 MG/1
100 CAPSULE ORAL 2 TIMES DAILY
Qty: 20 CAPSULE | Refills: 0 | Status: SHIPPED | OUTPATIENT
Start: 2017-09-19 | End: 2017-09-29

## 2017-09-19 NOTE — PROGRESS NOTES
I assume primary medical responsibility for this patient, I have reviewed the case history, findings, diagnosis and treatment plan with the resident and agree that the care is reasonable and necessary. This service has been performed by a resident without the presence of a teaching physician under the primary care exception  [address]  9/19/2017

## 2017-09-19 NOTE — PROGRESS NOTES
Chief Complaint   Patient presents with    Post-op Evaluation     wound check     Treatment History  9/28/15: Total thyroidectomy for [id number] papillary thyroid carcinoma ([facility]).  8/25/17: Left neck lymph node biopsy ()     HPI   32 y.o. female presents with the above treatment history. Over the weekend, she noticed purulent drainage from her incision. She then developed a small pustule to the incision. It is tender. She has no fever or chills. She is breathing and swallowing comfortably.     She was seen at [facility] where she underwent FNA of a L cervical node.  Path revealed lymphocytes.  She reports persistent L neck and facial pain which has been present for mos.  This pain is localized to the L preauricular region.  She also reports associated pain above her L eye.  CT neck revealed mildly enlarged bilateral level II nodes.     Review of Systems   Constitutional: Negative for fatigue and unexpected weight change.   HENT: Per HPI.  Eyes: Negative for visual disturbance.   Respiratory: Negative for shortness of breath, hemoptysis   Cardiovascular: Negative for chest pain and palpitations.   Musculoskeletal: Negative for decreased ROM, back pain.   Skin: Negative for rash, sunburn, itching.   Neurological: Negative for dizziness and seizures.   Hematological: Negative for adenopathy. Does not bruise/bleed easily.   Endocrine: Negative for rapid weight loss/weight gain, heat/cold intolerance.     Past Medical History   Patient Active Problem List   Diagnosis    Neck pain    Pharyngitis    Thrombosed external hemorrhoid    Rhinitis    URI (upper respiratory infection)    Anxiety and depression    Fatigue    Perineal rash in female    Allergic rhinitis    Hx of papillary thyroid carcinoma    H/O thyroidectomy    .    Postoperative hypothyroidism    Mastitis    Pyelonephritis    Malaise    Lymphadenopathy, cervical                       Past Surgical History   Thyroidectomy  Gastric  sleeve   x 3      Family History   Thyroid cancer        Social History   .  Social History     Social History    Marital status:      Spouse name: N/A    Number of children: N/A    Years of education: N/A     Occupational History    Not on file.     Social History Main Topics    Smoking status: Former Smoker     Packs/day: 0.50     Years: 3.00    Smokeless tobacco: Never Used    Alcohol use No    Drug use: No    Sexual activity: Not on file     Other Topics Concern    Not on file     Social History Narrative    No narrative on file         Allergies   No Known Allergies        Physical Exam     Vitals:    17 1307   BP: 121/71   Pulse: 68         There is no height or weight on file to calculate BMI.      General: AOx3, NAD   Neck: Well-healed thyroidectomy scar.  Left neck incision CDI.  Incision line red with small white pustule. Several retained Monocryl sutures removed without difficulty. No purulent drainage expressed.  No fluid collection noted.  Edges well approximated.  Face: House Brackmann I bilaterally.     CT neck: reviewed    FINAL PATHOLOGIC DIAGNOSIS  LYMPH NODE, LEFT LEVEL II, EXCISIONAL BIOPSY:  --Follicular and paracortical hyperplasia (reactive lymph node), see comment.  COMMENT: The corresponding flow cytometric analysis (please see separate report) does not detect an abnormal  hematopoietic population.    Assessment   1. Lymphadenopathy, cervical        Plan   32 y.o. female with [id number] papillary thyroid carcinoma status post total thyroidectomy in 9/15 and now over 3 weeks s/p lymph node biopsy. Retained sutures removed. Prescription for doxycycline sent to pharmacy. Questions answered. Will RTC if symptoms worsen or do not improve.

## 2017-10-02 ENCOUNTER — LAB VISIT (OUTPATIENT)
Dept: LAB | Facility: HOSPITAL | Age: 32
End: 2017-10-02
Attending: FAMILY MEDICINE
Payer: MEDICAID

## 2017-10-02 DIAGNOSIS — Z85.850 HX OF PAPILLARY THYROID CARCINOMA: ICD-10-CM

## 2017-10-02 LAB
T4 SERPL-MCNC: 4.4 UG/DL
TSH SERPL DL<=0.005 MIU/L-ACNC: 0.79 UIU/ML

## 2017-10-02 PROCEDURE — 36415 COLL VENOUS BLD VENIPUNCTURE: CPT

## 2017-10-02 PROCEDURE — 84443 ASSAY THYROID STIM HORMONE: CPT

## 2017-10-02 PROCEDURE — 86800 THYROGLOBULIN ANTIBODY: CPT

## 2017-10-02 PROCEDURE — 84436 ASSAY OF TOTAL THYROXINE: CPT

## 2017-10-03 LAB
THRYOGLOBULIN INTERPRETATION: ABNORMAL
THYROGLOB AB SERPL-ACNC: <1.8 IU/ML
THYROGLOB SERPL-MCNC: 0.3 NG/ML

## 2017-10-20 ENCOUNTER — OFFICE VISIT (OUTPATIENT)
Dept: FAMILY MEDICINE | Facility: HOSPITAL | Age: 32
End: 2017-10-20
Attending: FAMILY MEDICINE
Payer: MEDICAID

## 2017-10-20 VITALS
BODY MASS INDEX: 38.91 KG/M2 | SYSTOLIC BLOOD PRESSURE: 107 MMHG | DIASTOLIC BLOOD PRESSURE: 63 MMHG | HEART RATE: 92 BPM | WEIGHT: 198.19 LBS | TEMPERATURE: 99 F | HEIGHT: 60 IN

## 2017-10-20 DIAGNOSIS — J06.9 UPPER RESPIRATORY TRACT INFECTION, UNSPECIFIED TYPE: Primary | ICD-10-CM

## 2017-10-20 LAB
B-HCG UR QL: NEGATIVE
CTP QC/QA: YES
FLUAV AG NPH QL: NEGATIVE
FLUBV AG NPH QL: NEGATIVE
S PYO RRNA THROAT QL PROBE: NEGATIVE

## 2017-10-20 PROCEDURE — 99214 OFFICE O/P EST MOD 30 MIN: CPT | Performed by: STUDENT IN AN ORGANIZED HEALTH CARE EDUCATION/TRAINING PROGRAM

## 2017-10-20 PROCEDURE — 87880 STREP A ASSAY W/OPTIC: CPT | Performed by: STUDENT IN AN ORGANIZED HEALTH CARE EDUCATION/TRAINING PROGRAM

## 2017-10-20 PROCEDURE — 87804 INFLUENZA ASSAY W/OPTIC: CPT | Performed by: STUDENT IN AN ORGANIZED HEALTH CARE EDUCATION/TRAINING PROGRAM

## 2017-10-20 PROCEDURE — 81025 URINE PREGNANCY TEST: CPT | Performed by: STUDENT IN AN ORGANIZED HEALTH CARE EDUCATION/TRAINING PROGRAM

## 2017-10-20 RX ORDER — OSELTAMIVIR PHOSPHATE 75 MG/1
75 CAPSULE ORAL DAILY
Qty: 5 CAPSULE | Refills: 0 | Status: SHIPPED | OUTPATIENT
Start: 2017-10-20 | End: 2017-10-25

## 2017-10-20 NOTE — PROGRESS NOTES
Subjective:       Patient ID: Monique Rai is a 32 y.o. female.    Chief Complaint: Sinus Problem and Fatigue    31 yo F c/o subjective fever, muscle aches, running nose, left ear pain, migraine 2/2 left sinus tendernes for the past few days, worsening. She works in day care (owner) and recently kids have flu, strep and rsv. She tried self- treated with homeopathic oils, advil, and tylenol without improvement. She has a pmx of papillary carcinoma of the thyroid, s/p total thyroidectomy in 2015. She had lymph node biopsy done in September 2017 which have been WNL. She denies productive coughs, sob, n/v/ and cp.      Sinus Problem   Associated symptoms include chills, congestion, ear pain and sinus pressure. Pertinent negatives include no coughing, shortness of breath or sore throat.   Fatigue   Associated symptoms include chills, congestion, fatigue, a fever and myalgias. Pertinent negatives include no abdominal pain, chest pain, coughing, nausea, sore throat or vomiting.     Review of Systems   Constitutional: Positive for chills, fatigue and fever.   HENT: Positive for congestion, ear discharge, ear pain, rhinorrhea, sinus pain and sinus pressure. Negative for sore throat.    Eyes: Positive for discharge.   Respiratory: Negative for apnea, cough, choking, chest tightness, shortness of breath and wheezing.    Cardiovascular: Negative for chest pain, palpitations and leg swelling.   Gastrointestinal: Negative for abdominal distention, abdominal pain, blood in stool, constipation, diarrhea, nausea and vomiting.   Endocrine: Positive for cold intolerance.   Musculoskeletal: Positive for back pain and myalgias.       Objective:      Vitals:    10/20/17 1030   BP: 107/63   Pulse: 92   Temp: 99 °F (37.2 °C)     Physical Exam   Constitutional: She is oriented to person, place, and time. She appears well-developed and well-nourished. No distress.   HENT:   Head: Normocephalic and atraumatic.   Nose: Nose normal.    Mouth/Throat: No oropharyngeal exudate.   Left maxillary sinus tenderness   Eyes: Conjunctivae and EOM are normal. Pupils are equal, round, and reactive to light. Right eye exhibits no discharge. Left eye exhibits discharge. No scleral icterus.   Neck: Normal range of motion. Neck supple. No JVD present. Thyromegaly present.   Cardiovascular: Normal rate, regular rhythm, normal heart sounds and intact distal pulses.  Exam reveals no gallop and no friction rub.    No murmur heard.  Pulmonary/Chest: Effort normal and breath sounds normal. No respiratory distress. She has no wheezes. She has no rales. She exhibits no tenderness.   Abdominal: Soft. Bowel sounds are normal. She exhibits no distension and no mass. There is no tenderness. There is no guarding. No hernia.   Musculoskeletal: Normal range of motion. She exhibits tenderness (uppper arms). She exhibits no edema.   Lymphadenopathy:     She has no cervical adenopathy.   Neurological: She is alert and oriented to person, place, and time.   Skin: Skin is warm and dry. No rash noted. She is not diaphoretic. No erythema. No pallor.   Psychiatric: She has a normal mood and affect. Her behavior is normal. Judgment and thought content normal.   Nursing note and vitals reviewed.      Assessment:       1. Upper respiratory tract infection, unspecified type        Plan:       Upper respiratory tract infection, unspecified type  -     POCT Urine Pregnancy negative  -     POCT Rapid Strep A negative  -     POCT INFLUENZA A/B negative  -     oseltamivir (TAMIFLU) 75 MG capsule; Take 1 capsule (75 mg total) by mouth once daily.  Dispense: 5 capsule; Refill: 0    She said she will also get her flu shot in clinic next Thursday she scheduled for her contraceptive shot

## 2017-10-26 ENCOUNTER — PROCEDURE VISIT (OUTPATIENT)
Dept: FAMILY MEDICINE | Facility: HOSPITAL | Age: 32
End: 2017-10-26
Attending: FAMILY MEDICINE
Payer: MEDICAID

## 2017-10-26 VITALS
SYSTOLIC BLOOD PRESSURE: 127 MMHG | HEIGHT: 60 IN | DIASTOLIC BLOOD PRESSURE: 73 MMHG | HEART RATE: 93 BPM | BODY MASS INDEX: 39.22 KG/M2 | WEIGHT: 199.75 LBS

## 2017-10-26 DIAGNOSIS — Z30.017 NEXPLANON INSERTION: Primary | ICD-10-CM

## 2017-10-26 DIAGNOSIS — Z30.017 INSERTION OF NEXPLANON: ICD-10-CM

## 2017-10-26 DIAGNOSIS — G47.9 TROUBLE IN SLEEPING: ICD-10-CM

## 2017-10-26 DIAGNOSIS — R63.5 WEIGHT GAIN: ICD-10-CM

## 2017-10-26 DIAGNOSIS — R53.83 FATIGUE, UNSPECIFIED TYPE: ICD-10-CM

## 2017-10-26 RX ORDER — TRAZODONE HYDROCHLORIDE 50 MG/1
50 TABLET ORAL NIGHTLY PRN
Qty: 90 TABLET | Refills: 1 | Status: SHIPPED | OUTPATIENT
Start: 2017-10-26 | End: 2018-07-16

## 2017-10-26 NOTE — PROCEDURES
Patient evaluated for nexplanon placement. States her last period started on 10/11/17. States she has not been sexually active since finishing her period.   Risks and benefits of procedure discussed, patient signed formal consent.   Patient placed in supine position with L arm over head. Area cleaned with betadine x 3. Site anesthetized locally with lidocaine without epi 1%. Nexplanon placed and palpated. Pressure placed with gauze. Coband placed for added pressure. Patient without further questions or issues. Will RTC in one month or sooner if issues occur.

## 2017-11-17 ENCOUNTER — OFFICE VISIT (OUTPATIENT)
Dept: FAMILY MEDICINE | Facility: HOSPITAL | Age: 32
End: 2017-11-17
Attending: FAMILY MEDICINE
Payer: MEDICAID

## 2017-11-17 VITALS
DIASTOLIC BLOOD PRESSURE: 74 MMHG | SYSTOLIC BLOOD PRESSURE: 109 MMHG | HEIGHT: 60 IN | WEIGHT: 203.94 LBS | BODY MASS INDEX: 40.04 KG/M2 | HEART RATE: 81 BPM

## 2017-11-17 DIAGNOSIS — F41.8 DEPRESSION WITH ANXIETY: ICD-10-CM

## 2017-11-17 DIAGNOSIS — Z97.5 NEXPLANON IN PLACE: ICD-10-CM

## 2017-11-17 DIAGNOSIS — R45.86 MOOD SWINGS: Primary | ICD-10-CM

## 2017-11-17 DIAGNOSIS — F41.1 GAD (GENERALIZED ANXIETY DISORDER): ICD-10-CM

## 2017-11-17 PROCEDURE — 99213 OFFICE O/P EST LOW 20 MIN: CPT | Performed by: FAMILY MEDICINE

## 2017-11-17 RX ORDER — HYDROXYZINE HYDROCHLORIDE 25 MG/1
25 TABLET, FILM COATED ORAL 3 TIMES DAILY PRN
Qty: 30 TABLET | Refills: 1 | Status: SHIPPED | OUTPATIENT
Start: 2017-11-17 | End: 2019-07-18

## 2017-11-17 RX ORDER — ESCITALOPRAM OXALATE 20 MG/1
20 TABLET ORAL DAILY
Qty: 90 TABLET | Refills: 3 | Status: SHIPPED | OUTPATIENT
Start: 2017-11-17 | End: 2018-12-27 | Stop reason: SDUPTHER

## 2017-11-17 RX ORDER — VENLAFAXINE 37.5 MG/1
37.5 TABLET ORAL 2 TIMES DAILY
Qty: 90 TABLET | Refills: 3 | Status: SHIPPED | OUTPATIENT
Start: 2017-11-17 | End: 2018-10-11

## 2017-11-17 NOTE — PROGRESS NOTES
Subjective:       Patient ID: Monique Rai is a 32 y.o. female.    Chief Complaint: Follow-up and Contraception (issues)    Ms. Rai is a 32 year old female with a PMH of papillary carcinoma of the thyroid, s/p total thyroidectomy in 2015 (LN biopsy in 09/2017 WNL), and s/p Nexplanon insertion on 10/26/17 who is visiting for anxiety/Nexplanon side effects. She has been experiencing mood swings/agitation/depression that have been affecting her work and home life, but she denies any suicidal ideations. These were present prior to insertion but seem to be worsening. She endorses new-onset bloating, aches, fatigue, nausea, left axillary tenderness/swelling (resolved), and several episodes of hypoglycemia. She began her menstrual cycle yesterday, but it was late.    She took Prozac and Xanax as an adolescent with good response and had poor response to Wellbutrin. She is following up with her endocrinologist next week.       Review of Systems   Constitutional: Positive for appetite change (decreased). Negative for chills and fever.   HENT: Negative for congestion and sore throat.    Respiratory: Negative for cough and shortness of breath (with activity).    Cardiovascular: Negative for chest pain, palpitations and leg swelling.   Gastrointestinal: Positive for nausea. Negative for abdominal pain, constipation, diarrhea and vomiting.   Endocrine: Positive for cold intolerance.   Genitourinary: Negative for difficulty urinating and dysuria.       Objective:      Vitals:    11/17/17 1201   BP: 109/74   Pulse: 81     Physical Exam   Constitutional: She is oriented to person, place, and time. She appears well-developed and well-nourished. No distress.   HENT:   Head: Normocephalic and atraumatic.   Eyes: EOM are normal. Pupils are equal, round, and reactive to light.   Neck: Normal range of motion. Neck supple.   Cardiovascular: Normal rate, regular rhythm and normal heart sounds.  Exam reveals no gallop and no  friction rub.    No murmur heard.  Pulmonary/Chest: Effort normal and breath sounds normal. No respiratory distress. She has no wheezes. She has no rales.   Abdominal: Soft. She exhibits no distension. There is no tenderness.   Neurological: She is alert and oriented to person, place, and time.   Skin: Skin is warm and dry.   Nexplanon appropriately placed medially in arm 2.5cm superior to elbow.     Psychiatric:   Anxious with rapid speech, labile mood, crying later in exam       Assessment:       1. Mood swings    2. DRU (generalized anxiety disorder)    3. Nexplanon in place    4. Depression with anxiety        Plan:       Mood swings    DRU (generalized anxiety disorder)  -     venlafaxine (EFFEXOR) 37.5 MG Tab; Take 1 tablet (37.5 mg total) by mouth 2 (two) times daily.  Dispense: 90 tablet; Refill: 3  -     escitalopram oxalate (LEXAPRO) 20 MG tablet; Take 1 tablet (20 mg total) by mouth once daily.  Dispense: 90 tablet; Refill: 3    Nexplanon in place    Depression with anxiety  -     venlafaxine (EFFEXOR) 37.5 MG Tab; Take 1 tablet (37.5 mg total) by mouth 2 (two) times daily.  Dispense: 90 tablet; Refill: 3  -     escitalopram oxalate (LEXAPRO) 20 MG tablet; Take 1 tablet (20 mg total) by mouth once daily.  Dispense: 90 tablet; Refill: 3    Other orders  -     hydrOXYzine HCl (ATARAX) 25 MG tablet; Take 1 tablet (25 mg total) by mouth 3 (three) times daily as needed for Itching.  Dispense: 30 tablet; Refill: 1    Denies SI/HI/ current depression. Counseled patient on the options of removing Nexplanon vs. Leaving in. Counseled patient that most SE's resolved over the first 1-2 months after placement. Will start Effexor and Atarax PRN. Counseled only to take Atarax at home and not to drive with medication. Counseled to RT ed if F/Chills/flu-like symptoms/manic symptoms after starting Effexor. Patient agrees with plan and will return sooner if symptoms are too hard to control. F/U with Endocrinologist appt and  recs.     Return in about 2 weeks (around 12/1/2017).

## 2018-01-04 ENCOUNTER — PATIENT MESSAGE (OUTPATIENT)
Dept: OTOLARYNGOLOGY | Facility: CLINIC | Age: 33
End: 2018-01-04

## 2018-01-30 ENCOUNTER — PROCEDURE VISIT (OUTPATIENT)
Dept: FAMILY MEDICINE | Facility: HOSPITAL | Age: 33
End: 2018-01-30
Attending: FAMILY MEDICINE
Payer: MEDICAID

## 2018-01-30 VITALS
HEART RATE: 82 BPM | DIASTOLIC BLOOD PRESSURE: 69 MMHG | HEIGHT: 60 IN | WEIGHT: 219.38 LBS | SYSTOLIC BLOOD PRESSURE: 108 MMHG | BODY MASS INDEX: 43.07 KG/M2

## 2018-01-30 DIAGNOSIS — E03.9 HYPOTHYROIDISM, UNSPECIFIED TYPE: ICD-10-CM

## 2018-01-30 DIAGNOSIS — Z30.46 NEXPLANON REMOVAL: Primary | ICD-10-CM

## 2018-01-30 DIAGNOSIS — F41.1 GAD (GENERALIZED ANXIETY DISORDER): ICD-10-CM

## 2018-01-30 RX ORDER — LEVOTHYROXINE SODIUM 150 UG/1
225 TABLET ORAL DAILY
Refills: 11 | COMMUNITY
Start: 2018-01-22 | End: 2018-10-11

## 2018-01-30 NOTE — PROCEDURES
Patient consented. All questions answered. Area cleansed with alcohol and Nexplanon palpated in place. One cc of lidocaine 1% without epi injection locally for pain control. Small stab incision made. Nexplanon removed with forceps and hemostat. Small amount of bleed controlled with gauze. Wrapped with coband. F/U in one month or sooner if complications.     1/30/2018 12:33 PM Patrick Boateng M.D.

## 2018-07-16 ENCOUNTER — OFFICE VISIT (OUTPATIENT)
Dept: FAMILY MEDICINE | Facility: HOSPITAL | Age: 33
End: 2018-07-16
Payer: MEDICAID

## 2018-07-16 VITALS
DIASTOLIC BLOOD PRESSURE: 76 MMHG | HEIGHT: 60 IN | TEMPERATURE: 99 F | HEART RATE: 76 BPM | WEIGHT: 212.94 LBS | BODY MASS INDEX: 41.81 KG/M2 | SYSTOLIC BLOOD PRESSURE: 118 MMHG

## 2018-07-16 DIAGNOSIS — J06.9 UPPER RESPIRATORY TRACT INFECTION, UNSPECIFIED TYPE: Primary | ICD-10-CM

## 2018-07-16 PROCEDURE — 99214 OFFICE O/P EST MOD 30 MIN: CPT | Performed by: FAMILY MEDICINE

## 2018-07-16 RX ORDER — FERROUS SULFATE 325(65) MG
TABLET ORAL
Refills: 3 | COMMUNITY
Start: 2018-07-05

## 2018-07-16 RX ORDER — THYROID 30 MG/1
30 TABLET ORAL
Qty: 90 TABLET | Refills: 1 | Status: SHIPPED | OUTPATIENT
Start: 2018-07-16 | End: 2019-06-17

## 2018-07-16 RX ORDER — ESCITALOPRAM OXALATE 10 MG/1
TABLET ORAL
Refills: 1 | COMMUNITY
Start: 2018-06-14 | End: 2018-07-16

## 2018-07-16 RX ORDER — DOCUSATE SODIUM 100 MG/1
CAPSULE, LIQUID FILLED ORAL
Refills: 3 | COMMUNITY
Start: 2018-07-05 | End: 2018-07-16

## 2018-07-16 RX ORDER — AZITHROMYCIN 250 MG/1
TABLET, FILM COATED ORAL
Qty: 6 TABLET | Refills: 0 | Status: SHIPPED | OUTPATIENT
Start: 2018-07-16 | End: 2018-07-21

## 2018-07-16 NOTE — PROGRESS NOTES
Subjective:       Patient ID: Monique Rai is a 33 y.o. female.    Chief Complaint: Cough and Sore Throat    Ms. Rai is a 33 year old female with PMH of papillary carcinoma of the thyroid, s/p total thyroidectomy in 2015 (LN biopsy in 09/2017 WNL), and s/p Nexplanon insertion on 10/26/17 who presents urgently for sore throat. Patient reports feeling crumby. States it started two weeks ago, didn't improve after a week. Started to have a productive cough, yellow sputum. Fever at home of 101. +N/V/body aches/headaches/runny nose. Feels like she has been getting worse. Tylenol and ibuprofen has been helping with f. Hasn't tried abx.    Owns a , sick contacts at work.       Review of Systems   Constitutional: Negative for chills and fever.   HENT: Negative for congestion and sore throat.    Eyes: Negative for pain and redness.   Respiratory: Negative for cough and shortness of breath.    Cardiovascular: Negative for chest pain and palpitations.   Gastrointestinal: Negative for diarrhea, nausea and vomiting.   Genitourinary: Negative for difficulty urinating and dysuria.   Musculoskeletal: Negative for arthralgias and myalgias.   Skin: Negative for rash.   Neurological: Negative for syncope and headaches.   Psychiatric/Behavioral: Negative for agitation and confusion.       Objective:      Vitals:    07/16/18 1039   BP: 118/76   Pulse: 76   Temp: 98.6 °F (37 °C)     Physical Exam   Constitutional: She is oriented to person, place, and time. She appears well-developed and well-nourished. No distress.   HENT:   Head: Normocephalic and atraumatic.   Right Ear: External ear normal.   Left Ear: External ear normal.   Eyes: Conjunctivae are normal. Pupils are equal, round, and reactive to light. Right eye exhibits no discharge. Left eye exhibits no discharge.   Neck: Normal range of motion. No JVD present. No thyromegaly present.   Cardiovascular: Normal rate and regular rhythm.  Exam reveals no gallop and no  friction rub.    No murmur heard.  Pulmonary/Chest: Effort normal and breath sounds normal. No respiratory distress. She has no wheezes. She has no rales.   Abdominal: Soft. Bowel sounds are normal. She exhibits no distension. There is no tenderness.   Musculoskeletal: Normal range of motion.   Neurological: She is alert and oriented to person, place, and time. She has normal reflexes.   Skin: Skin is warm. No rash noted. No erythema.   Psychiatric: She has a normal mood and affect. Her behavior is normal.       Assessment:       1. Upper respiratory tract infection, unspecified type        Plan:       Upper respiratory tract infection, unspecified type  -     azithromycin (Z-MARVA) 250 MG tablet; Take 2 tablets by mouth on day 1; Take 1 tablet by mouth on days 2-5  Dispense: 6 tablet; Refill: 0    Patient had thyroid levels about 4 weeks ago but has been off her armor. Will refill to get patient to endocrinology appointment in October. Received Adipex from the weight loss clinic. Discuss weight loss and URI follow-up at next visit.     Follow-up in about 1 week (around 7/23/2018) for Regular follow-up.

## 2018-07-23 RX ORDER — CYANOCOBALAMIN 1000 UG/ML
INJECTION, SOLUTION INTRAMUSCULAR; SUBCUTANEOUS
Qty: 11 ML | Refills: 0 | OUTPATIENT
Start: 2018-07-23

## 2018-07-23 RX ORDER — CYANOCOBALAMIN 1000 UG/ML
1000 INJECTION, SOLUTION INTRAMUSCULAR; SUBCUTANEOUS
Qty: 11 ML | Refills: 0 | Status: SHIPPED | OUTPATIENT
Start: 2018-07-23 | End: 2019-06-17 | Stop reason: SDUPTHER

## 2018-07-29 NOTE — PROGRESS NOTES
Case discussed with resident at time of visit.  I have reviewed and concur with the resident's evaluation, assessment, and plan.  Rx Z-pack for respiratory infx.

## 2018-10-02 ENCOUNTER — OFFICE VISIT (OUTPATIENT)
Dept: FAMILY MEDICINE | Facility: HOSPITAL | Age: 33
End: 2018-10-02
Attending: FAMILY MEDICINE
Payer: MEDICAID

## 2018-10-02 ENCOUNTER — CLINICAL SUPPORT (OUTPATIENT)
Dept: LAB | Facility: HOSPITAL | Age: 33
End: 2018-10-02
Attending: FAMILY MEDICINE
Payer: MEDICAID

## 2018-10-02 ENCOUNTER — HOSPITAL ENCOUNTER (OUTPATIENT)
Dept: RADIOLOGY | Facility: HOSPITAL | Age: 33
Discharge: HOME | End: 2018-10-02
Attending: FAMILY MEDICINE
Payer: MEDICAID

## 2018-10-02 VITALS
WEIGHT: 222.88 LBS | HEART RATE: 87 BPM | SYSTOLIC BLOOD PRESSURE: 106 MMHG | HEIGHT: 60 IN | BODY MASS INDEX: 43.76 KG/M2 | DIASTOLIC BLOOD PRESSURE: 71 MMHG

## 2018-10-02 DIAGNOSIS — R07.9 CHEST PAIN, UNSPECIFIED TYPE: Primary | ICD-10-CM

## 2018-10-02 DIAGNOSIS — R07.9 CHEST PAIN, UNSPECIFIED TYPE: ICD-10-CM

## 2018-10-02 PROCEDURE — 71046 X-RAY EXAM CHEST 2 VIEWS: CPT | Mod: TC,FY

## 2018-10-02 PROCEDURE — 71046 X-RAY EXAM CHEST 2 VIEWS: CPT | Mod: 26,,, | Performed by: RADIOLOGY

## 2018-10-02 PROCEDURE — 93005 ELECTROCARDIOGRAM TRACING: CPT

## 2018-10-02 PROCEDURE — 99215 OFFICE O/P EST HI 40 MIN: CPT | Mod: 25 | Performed by: FAMILY MEDICINE

## 2018-10-02 NOTE — PROGRESS NOTES
I have reviewed the notes, assessments, and/or procedures performed by Dr. Boateng, I concur with her/his documentation of Monique Rai.

## 2018-10-02 NOTE — PROGRESS NOTES
Subjective:       Patient ID: Monique Rai is a 33 y.o. female.    Chief Complaint: Chest Pain and Edema    Ms. Rai is a 33 year old female with PMH DRU, depression, thyroid disease who presents urgently for L chest/back/shoulder aching pain. Intermittent, lasting ~20 minutes at a time. Started walking about three weeks ago. Worse with stress & yelling at two year old. +palpitations/SOB. States it started a month ago and felt like a muscle spasm. States that she started getting cold again and started gaining more weight. +swelling in legs. States she has had recent trouble focusing as well. Has been taking her medication every day, has two days left of armour, sees endocrinologist at end of month.     Family history MI in father 39yo, brother 37yo. Grandfather  at age 40 MI.     Last eye exam in April, stable eye.     Hasn't been eating very much, 400-800 calories a day. Since weight loss surgery (5 years ago, sleeve).       Review of Systems   Constitutional: Negative for chills and fever.   HENT: Negative for congestion and sore throat.    Eyes: Negative for pain and redness.   Respiratory: Negative for cough and shortness of breath.    Cardiovascular: Negative for chest pain and palpitations.   Gastrointestinal: Negative for abdominal pain, diarrhea, nausea and vomiting.   Genitourinary: Negative for difficulty urinating and dysuria.   Musculoskeletal: Negative for arthralgias and myalgias.   Skin: Negative for rash.   Neurological: Negative for syncope and headaches.   Psychiatric/Behavioral: Negative for agitation and confusion.       Objective:      Vitals:    10/02/18 1519   BP: 106/71   Pulse: 87     Physical Exam   Constitutional: She is oriented to person, place, and time. She appears well-developed and well-nourished. No distress.   BMI 43.53   HENT:   Head: Normocephalic and atraumatic.   Right Ear: External ear normal.   Left Ear: External ear normal.   Eyes: Conjunctivae are normal. Pupils  are equal, round, and reactive to light. Right eye exhibits no discharge. Left eye exhibits no discharge.   Neck: Normal range of motion. No JVD present. No thyromegaly present.   Cardiovascular: Normal rate and regular rhythm. Exam reveals no gallop and no friction rub.   No murmur heard.  Pulmonary/Chest: Effort normal and breath sounds normal. No respiratory distress. She has no wheezes. She has no rales.   Abdominal: Soft. Bowel sounds are normal. She exhibits no distension. There is no tenderness.   Musculoskeletal: Normal range of motion.   Neurological: She is alert and oriented to person, place, and time. She has normal reflexes.   Skin: Skin is warm. No rash noted. No erythema.   Psychiatric: She has a normal mood and affect. Her behavior is normal.       Assessment:       1. Chest pain, unspecified type        Plan:       Chest pain, unspecified type  -     Exercise stress echo with color flow; Future  -     Ambulatory referral to Cardiology  -     Lipid panel; Future; Expected date: 10/02/2018  -     Hemoglobin A1c; Future; Expected date: 10/02/2018  -     Comprehensive metabolic panel; Future; Expected date: 10/02/2018  -     SCHEDULED EKG 12-LEAD (to Muse); Future  -     X-Ray Chest PA And Lateral; Future; Expected date: 10/02/2018    History atypical but patient with significant family history, will refer for stress study and cardiology. F/u endocrine notes and labs.     Follow-up in about 2 weeks (around 10/16/2018).

## 2018-10-03 ENCOUNTER — TELEPHONE (OUTPATIENT)
Dept: FAMILY MEDICINE | Facility: HOSPITAL | Age: 33
End: 2018-10-03

## 2018-10-03 NOTE — TELEPHONE ENCOUNTER
----- Message from Humera Che MA sent at 10/3/2018 10:35 AM CDT -----  Patient states her psh is 33; just saw results on my [facility].  Is very upset and wants to discuss.  Thanks.

## 2018-10-03 NOTE — TELEPHONE ENCOUNTER
----- Message from Christine Wells sent at 10/3/2018  2:03 PM CDT -----  Contact: Lab results  Hello,     Patient got her lab results with a TSH number of  33.02. Patient states that the last time she had a number so high she had to be hospitalized. Could you please give her a call at ?    Thanks!     Christine

## 2018-10-04 NOTE — TELEPHONE ENCOUNTER
Attempted to call patient multiple times. Patient needs to schedule follow-up ASAP with a physician in our clinic to titrate up her thyroid medication. Patient also has appointment with her endocrinologist coming up.     10/4/2018 4:50 PM Patrick Boateng M.D.

## 2018-10-05 ENCOUNTER — TELEPHONE (OUTPATIENT)
Dept: FAMILY MEDICINE | Facility: HOSPITAL | Age: 33
End: 2018-10-05

## 2018-10-05 DIAGNOSIS — E89.0 POSTOPERATIVE HYPOTHYROIDISM: Primary | ICD-10-CM

## 2018-10-05 RX ORDER — CYANOCOBALAMIN 1000 UG/ML
1000 INJECTION, SOLUTION INTRAMUSCULAR; SUBCUTANEOUS
Qty: 10 ML | Refills: 1 | Status: SHIPPED | OUTPATIENT
Start: 2018-10-05 | End: 2019-08-06 | Stop reason: SDUPTHER

## 2018-10-05 NOTE — TELEPHONE ENCOUNTER
----- Message from Humera Che MA sent at 10/5/2018 10:23 AM CDT -----  Patient missed call; please call again.  Thanks.

## 2018-10-10 ENCOUNTER — HOSPITAL ENCOUNTER (OUTPATIENT)
Dept: CARDIOLOGY | Facility: HOSPITAL | Age: 33
Discharge: HOME | End: 2018-10-10
Attending: FAMILY MEDICINE
Payer: MEDICAID

## 2018-10-10 DIAGNOSIS — R07.9 CHEST PAIN, UNSPECIFIED TYPE: ICD-10-CM

## 2018-10-10 PROCEDURE — 93320 DOPPLER ECHO COMPLETE: CPT

## 2018-10-11 ENCOUNTER — OFFICE VISIT (OUTPATIENT)
Dept: FAMILY MEDICINE | Facility: HOSPITAL | Age: 33
End: 2018-10-11
Attending: FAMILY MEDICINE
Payer: MEDICAID

## 2018-10-11 VITALS
SYSTOLIC BLOOD PRESSURE: 103 MMHG | HEIGHT: 60 IN | BODY MASS INDEX: 43.33 KG/M2 | DIASTOLIC BLOOD PRESSURE: 71 MMHG | HEART RATE: 82 BPM | WEIGHT: 220.69 LBS

## 2018-10-11 DIAGNOSIS — F41.1 GAD (GENERALIZED ANXIETY DISORDER): ICD-10-CM

## 2018-10-11 DIAGNOSIS — Z23 NEED FOR PROPHYLACTIC VACCINATION AND INOCULATION AGAINST INFLUENZA: ICD-10-CM

## 2018-10-11 DIAGNOSIS — E89.0 POSTOPERATIVE HYPOTHYROIDISM: Primary | ICD-10-CM

## 2018-10-11 DIAGNOSIS — Z30.015 ENCOUNTER FOR INITIAL PRESCRIPTION OF VAGINAL RING HORMONAL CONTRACEPTIVE: ICD-10-CM

## 2018-10-11 DIAGNOSIS — E89.0 H/O THYROIDECTOMY: ICD-10-CM

## 2018-10-11 DIAGNOSIS — Z85.850 HX OF PAPILLARY THYROID CARCINOMA: ICD-10-CM

## 2018-10-11 LAB
DIASTOLIC DYSFUNCTION: NO
ESTIMATED PA SYSTOLIC PRESSURE: 23.25
RETIRED EF AND QEF - SEE NOTES: 60 (ref 55–65)

## 2018-10-11 PROCEDURE — 90471 IMMUNIZATION ADMIN: CPT

## 2018-10-11 PROCEDURE — 99214 OFFICE O/P EST MOD 30 MIN: CPT | Mod: 25 | Performed by: FAMILY MEDICINE

## 2018-10-11 RX ORDER — ETONOGESTREL AND ETHINYL ESTRADIOL VAGINAL RING .015; .12 MG/D; MG/D
1 RING VAGINAL
Qty: 1 EACH | Refills: 11 | Status: SHIPPED | OUTPATIENT
Start: 2018-10-11 | End: 2019-08-08

## 2018-10-11 RX ORDER — BUPROPION HYDROCHLORIDE 150 MG/1
150 TABLET ORAL DAILY
Qty: 30 TABLET | Refills: 0 | Status: SHIPPED | OUTPATIENT
Start: 2018-10-11 | End: 2019-06-17

## 2018-10-11 NOTE — PROGRESS NOTES
I assume primary medical responsibility for this patient, I have reviewed the case history, findings, diagnosis and treatment plan with the resident and agree that the care is reasonable and necessary. This service has been performed by a resident without the presence of a teaching physician under the primary care exception  [address]  10/11/2018

## 2018-10-22 ENCOUNTER — TELEPHONE (OUTPATIENT)
Dept: FAMILY MEDICINE | Facility: HOSPITAL | Age: 33
End: 2018-10-22

## 2018-10-22 NOTE — TELEPHONE ENCOUNTER
----- Message from Humera Che MA sent at 10/10/2018  8:35 AM CDT -----  Please call diagnostics regarding patient's stress test; they told patient they sent paperwork saying that something was incomplete last week and have not received it back.  Patient's appointment is today.  Thanks.

## 2018-10-25 ENCOUNTER — TELEPHONE (OUTPATIENT)
Dept: FAMILY MEDICINE | Facility: HOSPITAL | Age: 33
End: 2018-10-25

## 2018-10-25 DIAGNOSIS — E89.0 POSTOPERATIVE HYPOTHYROIDISM: Primary | ICD-10-CM

## 2018-10-25 RX ORDER — THYROID, PORCINE 180 MG/1
180 TABLET ORAL DAILY
Qty: 30 TABLET | Refills: 11 | Status: SHIPPED | OUTPATIENT
Start: 2018-10-25 | End: 2019-06-17 | Stop reason: SDUPTHER
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
## 2018-10-30 ENCOUNTER — OFFICE VISIT (OUTPATIENT)
Dept: FAMILY MEDICINE | Facility: HOSPITAL | Age: 33
End: 2018-10-30
Attending: FAMILY MEDICINE
Payer: MEDICAID

## 2018-10-30 VITALS
HEART RATE: 105 BPM | HEIGHT: 60 IN | BODY MASS INDEX: 42.42 KG/M2 | DIASTOLIC BLOOD PRESSURE: 89 MMHG | SYSTOLIC BLOOD PRESSURE: 130 MMHG | WEIGHT: 216.06 LBS

## 2018-10-30 DIAGNOSIS — F32.A ANXIETY AND DEPRESSION: Primary | ICD-10-CM

## 2018-10-30 DIAGNOSIS — G47.9 TROUBLE IN SLEEPING: ICD-10-CM

## 2018-10-30 DIAGNOSIS — Z85.850 HX OF PAPILLARY THYROID CARCINOMA: ICD-10-CM

## 2018-10-30 DIAGNOSIS — F41.9 ANXIETY AND DEPRESSION: Primary | ICD-10-CM

## 2018-10-30 PROCEDURE — 99214 OFFICE O/P EST MOD 30 MIN: CPT | Performed by: FAMILY MEDICINE

## 2018-10-30 RX ORDER — BUPROPION HYDROCHLORIDE 150 MG/1
150 TABLET ORAL DAILY
Qty: 90 TABLET | Refills: 1 | Status: SHIPPED | OUTPATIENT
Start: 2018-10-30 | End: 2019-04-28
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
## 2018-11-26 ENCOUNTER — OFFICE VISIT (OUTPATIENT)
Dept: URGENT CARE | Facility: CLINIC | Age: 33
End: 2018-11-26
Payer: MEDICAID

## 2018-11-26 VITALS
RESPIRATION RATE: 18 BRPM | TEMPERATURE: 99 F | WEIGHT: 216 LBS | SYSTOLIC BLOOD PRESSURE: 127 MMHG | OXYGEN SATURATION: 100 % | HEIGHT: 60 IN | BODY MASS INDEX: 42.41 KG/M2 | DIASTOLIC BLOOD PRESSURE: 78 MMHG | HEART RATE: 88 BPM

## 2018-11-26 DIAGNOSIS — H66.92 OTITIS OF LEFT EAR: ICD-10-CM

## 2018-11-26 DIAGNOSIS — J01.90 ACUTE NON-RECURRENT SINUSITIS, UNSPECIFIED LOCATION: Primary | ICD-10-CM

## 2018-11-26 DIAGNOSIS — R05.9 COUGH: ICD-10-CM

## 2018-11-26 LAB
CTP QC/QA: YES
FLUAV AG NPH QL: NEGATIVE
FLUBV AG NPH QL: NEGATIVE

## 2018-11-26 PROCEDURE — 99214 OFFICE O/P EST MOD 30 MIN: CPT | Mod: 25,S$GLB,, | Performed by: PHYSICIAN ASSISTANT

## 2018-11-26 PROCEDURE — 87804 INFLUENZA ASSAY W/OPTIC: CPT | Mod: QW,S$GLB,, | Performed by: PHYSICIAN ASSISTANT

## 2018-11-26 RX ORDER — DEXAMETHASONE SODIUM PHOSPHATE 100 MG/10ML
6 INJECTION INTRAMUSCULAR; INTRAVENOUS ONCE
Status: COMPLETED | OUTPATIENT
Start: 2018-11-26 | End: 2018-11-26

## 2018-11-26 RX ORDER — AMOXICILLIN 875 MG/1
875 TABLET, FILM COATED ORAL 2 TIMES DAILY
Qty: 20 TABLET | Refills: 0 | Status: SHIPPED | OUTPATIENT
Start: 2018-11-26 | End: 2018-12-06

## 2018-11-26 RX ADMIN — DEXAMETHASONE SODIUM PHOSPHATE 6 MG: 100 INJECTION INTRAMUSCULAR; INTRAVENOUS at 12:11

## 2018-11-26 NOTE — PROGRESS NOTES
Subjective:       Patient ID: Monique Rai is a 33 y.o. female.    Vitals:  height is 5' (1.524 m) and weight is 98 kg (216 lb). Her temperature is 98.5 °F (36.9 °C). Her blood pressure is 127/78 and her pulse is 88. Her respiration is 18 and oxygen saturation is 100%.     Chief Complaint: Cough    Cough   This is a new problem. The current episode started yesterday. The problem has been unchanged. The problem occurs constantly. The cough is non-productive. Associated symptoms include ear pain, headaches and myalgias. Pertinent negatives include no chills, eye redness, fever, hemoptysis, rash, sore throat, shortness of breath or wheezing. Nothing aggravates the symptoms. She has tried nothing for the symptoms.       Constitution: Positive for fatigue and generalized weakness. Negative for chills, sweating and fever.   HENT: Positive for ear pain and congestion. Negative for sinus pain, sinus pressure, sore throat and voice change.    Neck: Negative for painful lymph nodes.   Eyes: Negative for eye redness.   Respiratory: Positive for cough. Negative for chest tightness, sputum production, bloody sputum, COPD, shortness of breath, stridor, wheezing and asthma.    Gastrointestinal: Positive for nausea. Negative for vomiting.   Musculoskeletal: Positive for muscle ache.   Skin: Negative for rash.   Allergic/Immunologic: Negative for seasonal allergies and asthma.   Neurological: Positive for headaches.   Hematologic/Lymphatic: Negative for swollen lymph nodes.       Objective:      Physical Exam   Constitutional: She is oriented to person, place, and time. She appears well-developed and well-nourished. She is cooperative.  Non-toxic appearance. She does not appear ill. No distress.   HENT:   Head: Normocephalic and atraumatic.   Right Ear: Hearing, tympanic membrane, external ear and ear canal normal.   Left Ear: Hearing, external ear and ear canal normal. Tympanic membrane is injected. A middle ear effusion is  present.   Nose: Mucosal edema, rhinorrhea and sinus tenderness present. No nasal deformity. No epistaxis. Right sinus exhibits no maxillary sinus tenderness and no frontal sinus tenderness. Left sinus exhibits no maxillary sinus tenderness and no frontal sinus tenderness.   Mouth/Throat: Uvula is midline, oropharynx is clear and moist and mucous membranes are normal. No trismus in the jaw. Normal dentition. No uvula swelling. No posterior oropharyngeal erythema.   Eyes: Conjunctivae and lids are normal. No scleral icterus.   Sclera clear bilat   Neck: Trachea normal, full passive range of motion without pain and phonation normal. Neck supple.   Cardiovascular: Normal rate, regular rhythm, normal heart sounds, intact distal pulses and normal pulses.   Pulmonary/Chest: Effort normal and breath sounds normal. No respiratory distress.   Abdominal: Soft. Normal appearance and bowel sounds are normal. She exhibits no distension. There is no tenderness.   Musculoskeletal: Normal range of motion. She exhibits no edema or deformity.   Neurological: She is alert and oriented to person, place, and time. She exhibits normal muscle tone. Coordination normal.   Skin: Skin is warm, dry and intact. She is not diaphoretic. No pallor.   Psychiatric: She has a normal mood and affect. Her speech is normal and behavior is normal. Judgment and thought content normal. Cognition and memory are normal.   Nursing note and vitals reviewed.      Assessment:       1. Acute non-recurrent sinusitis, unspecified location    2. Cough    3. Otitis of left ear        Plan:         Acute non-recurrent sinusitis, unspecified location  -     dexamethasone injection 6 mg  -     amoxicillin (AMOXIL) 875 MG tablet; Take 1 tablet (875 mg total) by mouth 2 (two) times daily. for 10 days  Dispense: 20 tablet; Refill: 0    Cough  -     POCT Influenza A/B    Otitis of left ear          Self-Care for Sinusitis     Drinking plenty of water can help sinuses  drain.   Sinusitis can often be managed with self-care. Self-care can keep sinuses moist and make you feel more comfortable. Remember to follow your doctor's instructions closely. This can make a big difference in getting your sinus problem under control.  Drink fluids  Drinking extra fluids helps thin your mucus. This lets it drain from your sinuses more easily. Have a glass of water every hour or two. A humidifier helps in much the same way. Fluids can also offset the drying effects of certain medicines. If you use a humidifier, follow the product maker's instructions on how to use it. Clean it on a regular schedule.  Use saltwater rinses  Rinses help keep your sinuses and nose moist. Mix a teaspoon of salt in 8 ounces of fresh, warm water. Use a bulb syringe to gently squirt the water into your nose a few times a day. You can also buy ready-made saline nasal sprays.  Apply hot or cold packs  Applying heat to the area surrounding your sinuses may make you feel more comfortable. Use a hot water bottle or a hand towel dipped in hot water. Some people also find ice packs effective for relieving pain.  Medicines  Your doctor may prescribe medications to help treat your sinusitis. If you have an infection, antibiotics can help clear it up. If you are prescribed antibiotics, take all pills on schedule until they are gone, even if you feel better. Decongestants help relieve swelling. Use decongestant sprays for short periods only under the direction of your doctor. If you have allergies, your doctor may prescribe medications to help relieve them.   Date Last Reviewed: 10/1/2016  © 8380-2029 [employer]. [address] [place] [address], [place] [address]. All rights reserved. This information is not intended as a substitute for professional medical care. Always follow your healthcare professional's instructions.    Please follow up with your Primary care provider within 2-5 days if your signs and symptoms have not  resolved or worsen.     If your condition worsens or fails to improve we recommend that you receive another evaluation at the emergency room immediately or contact your primary medical clinic to discuss your concerns.   You must understand that you have received an Urgent Care treatment only and that you may be released before all of your medical problems are known or treated. You, the patient, will arrange for follow up care as instructed.     RED FLAGS/WARNING SYMPTOMS DISCUSSED WITH PATIENT THAT WOULD WARRANT EMERGENT MEDICAL ATTENTION. PATIENT VERBALIZED UNDERSTANDING.

## 2018-11-26 NOTE — PATIENT INSTRUCTIONS
Self-Care for Sinusitis     Drinking plenty of water can help sinuses drain.   Sinusitis can often be managed with self-care. Self-care can keep sinuses moist and make you feel more comfortable. Remember to follow your doctor's instructions closely. This can make a big difference in getting your sinus problem under control.  Drink fluids  Drinking extra fluids helps thin your mucus. This lets it drain from your sinuses more easily. Have a glass of water every hour or two. A humidifier helps in much the same way. Fluids can also offset the drying effects of certain medicines. If you use a humidifier, follow the product maker's instructions on how to use it. Clean it on a regular schedule.  Use saltwater rinses  Rinses help keep your sinuses and nose moist. Mix a teaspoon of salt in 8 ounces of fresh, warm water. Use a bulb syringe to gently squirt the water into your nose a few times a day. You can also buy ready-made saline nasal sprays.  Apply hot or cold packs  Applying heat to the area surrounding your sinuses may make you feel more comfortable. Use a hot water bottle or a hand towel dipped in hot water. Some people also find ice packs effective for relieving pain.  Medicines  Your doctor may prescribe medications to help treat your sinusitis. If you have an infection, antibiotics can help clear it up. If you are prescribed antibiotics, take all pills on schedule until they are gone, even if you feel better. Decongestants help relieve swelling. Use decongestant sprays for short periods only under the direction of your doctor. If you have allergies, your doctor may prescribe medications to help relieve them.   Date Last Reviewed: 10/1/2016  © 3501-2826 [employer]. [address] [place] [address]. All rights reserved. This information is not intended as a substitute for professional medical care. Always follow your healthcare professional's instructions.    Please follow up with your  Primary care provider within 2-5 days if your signs and symptoms have not resolved or worsen.     If your condition worsens or fails to improve we recommend that you receive another evaluation at the emergency room immediately or contact your primary medical clinic to discuss your concerns.   You must understand that you have received an Urgent Care treatment only and that you may be released before all of your medical problems are known or treated. You, the patient, will arrange for follow up care as instructed.     RED FLAGS/WARNING SYMPTOMS DISCUSSED WITH PATIENT THAT WOULD WARRANT EMERGENT MEDICAL ATTENTION. PATIENT VERBALIZED UNDERSTANDING.

## 2018-12-27 DIAGNOSIS — F41.1 GAD (GENERALIZED ANXIETY DISORDER): ICD-10-CM

## 2018-12-27 DIAGNOSIS — F41.8 DEPRESSION WITH ANXIETY: ICD-10-CM

## 2018-12-27 RX ORDER — ESCITALOPRAM OXALATE 20 MG/1
20 TABLET ORAL DAILY
Qty: 90 TABLET | Refills: 3 | Status: SHIPPED | OUTPATIENT
Start: 2018-12-27 | End: 2019-06-17

## 2019-05-23 ENCOUNTER — TELEPHONE (OUTPATIENT)
Dept: FAMILY MEDICINE | Facility: HOSPITAL | Age: 34
End: 2019-05-23

## 2019-05-23 NOTE — TELEPHONE ENCOUNTER
----- Message from Humera Che MA sent at 5/22/2019 12:44 PM CDT -----  Patient states she needs referral to see her endocrinologist tomorrow; please send to Dr. Yan.  Please call patient to discuss.  Thanks.

## 2019-06-04 DIAGNOSIS — E89.0 POSTOPERATIVE HYPOTHYROIDISM: ICD-10-CM

## 2019-06-04 DIAGNOSIS — Z85.850 HX OF PAPILLARY THYROID CARCINOMA: Primary | ICD-10-CM

## 2019-06-17 ENCOUNTER — OFFICE VISIT (OUTPATIENT)
Dept: FAMILY MEDICINE | Facility: HOSPITAL | Age: 34
End: 2019-06-17
Attending: SPECIALIST
Payer: MEDICAID

## 2019-06-17 VITALS
SYSTOLIC BLOOD PRESSURE: 103 MMHG | DIASTOLIC BLOOD PRESSURE: 64 MMHG | HEART RATE: 83 BPM | HEIGHT: 60 IN | WEIGHT: 222 LBS | BODY MASS INDEX: 43.59 KG/M2

## 2019-06-17 DIAGNOSIS — Z3A.01 LESS THAN 8 WEEKS GESTATION OF PREGNANCY: Primary | ICD-10-CM

## 2019-06-17 DIAGNOSIS — E89.0 H/O THYROIDECTOMY: ICD-10-CM

## 2019-06-17 DIAGNOSIS — Z85.850 HX OF PAPILLARY THYROID CARCINOMA: ICD-10-CM

## 2019-06-17 PROCEDURE — 99213 OFFICE O/P EST LOW 20 MIN: CPT | Performed by: STUDENT IN AN ORGANIZED HEALTH CARE EDUCATION/TRAINING PROGRAM

## 2019-06-17 RX ORDER — ONDANSETRON 8 MG/1
8 TABLET, ORALLY DISINTEGRATING ORAL EVERY 8 HOURS PRN
Qty: 15 TABLET | Refills: 1 | Status: SHIPPED | OUTPATIENT
Start: 2019-06-17 | End: 2019-07-15

## 2019-06-17 NOTE — TELEPHONE ENCOUNTER
----- Message from Mike Cardona sent at 6/17/2019  3:16 PM CDT -----  PT CALL ASKING IF DR COLEMAN CAN PLEASE FILL ondansetron (ZOFRAN-ODT) 8 MG TbDL SHE IS FEELING REALLY BAD

## 2019-06-17 NOTE — PROGRESS NOTES
Subjective:       Patient ID: Monique Rai is a 34 y.o. female.    Chief Complaint: Positive pregnacy test    HPI     34 y.o female with history of papillary thyroid cancer s/p thyroidectomy shortly after her first pregnancy 4 years ago presents to the clinic for urgent visit secondary to a positive home pregnancy test. Patient states that prior PCP had been managing her thyroid medications (armour thyroid 30mg tab) along with her endocrinologist after failing synthroid a second time.  She switched endocrinologist recently and is now established with Dr. Yan.  She feels her thyroid levels had been stable up until a few weeks ago.    Patient states that she took plan B 24 hours after intercourse(May 20). She states that she has been nauseous and states that she is unsure whether this is due to possibly pregnancy since she has baseline nausea. She also reports that she has been symptomatic 2/2 to her hypothyroidism and reports increased cold intolerance, weight gain and fatigue at baseline that has progressively increased since the birth of her first child 4 years ago. She took a pregnancy test yesterday which was positive.  LMP 5/9/19.    She also reports that she needs an endocrinology authorization every 6 months per medicaid in order to continue seeing her endocrinologist.     Pt has been having problems getting in for another appointment with endocrinologist.    Has an established OB-GYN at [facility] whom she wishes to follow with- Dr. Aceves.    Review of Systems   Constitutional: Positive for fatigue. Negative for chills and fever.   HENT: Negative for congestion, ear pain and rhinorrhea.    Eyes: Negative for pain.   Respiratory: Negative for shortness of breath.    Cardiovascular: Negative for chest pain.   Gastrointestinal: Positive for nausea. Negative for abdominal pain, diarrhea and vomiting.   Endocrine: Positive for cold intolerance.   Genitourinary: Negative for dysuria.   Musculoskeletal:  "Negative for back pain, neck pain and neck stiffness.   Skin: Negative for rash.   Neurological: Negative for dizziness and headaches.   Psychiatric/Behavioral: The patient is not nervous/anxious.        Objective:      Vitals:    06/17/19 0925   BP: 103/64   Pulse: 83     Physical Exam   Constitutional: She is oriented to person, place, and time. She appears well-developed and well-nourished.   BMI 43.36 (lb 222 lb)   Neck: Normal range of motion. Neck supple.   Cardiovascular: Normal rate, regular rhythm, normal heart sounds and intact distal pulses.   No murmur heard.  Pulmonary/Chest: Effort normal and breath sounds normal.   Abdominal: Soft. Bowel sounds are normal.   Musculoskeletal: Normal range of motion.   Neurological: She is alert and oriented to person, place, and time.   Skin: Skin is warm and dry. Capillary refill takes less than 2 seconds.   Psychiatric: She has a normal mood and affect.   Nursing note and vitals reviewed.      UPT positive    Assessment:       1. Less than 8 weeks gestation of pregnancy    2. Hx of papillary thyroid carcinoma    3. H/O thyroidectomy        Plan:       Less than 8 weeks gestation of pregnancy, hx of multi-vitamin deficiency        -     Advised taking prenatal vitamin        -     Will f/u with endocrinologist and OB-GYN  -     TSH, T3, T4  -     Vitamin E, B1, B12, D, A, C, K 1 due to past history of being deficient in   Everything"  -      Defer OB labs to OB    Hx of papillary thyroid carcinoma/ thyroidectomy  -     TSH; Future; Expected date: 06/17/2019  -     T3; Future; Expected date: 06/17/2019  -     T4; Future; Expected date: 06/17/2019    Follow up in about 1 month (around 7/17/2019).      "

## 2019-06-18 ENCOUNTER — PATIENT MESSAGE (OUTPATIENT)
Dept: FAMILY MEDICINE | Facility: HOSPITAL | Age: 34
End: 2019-06-18

## 2019-06-18 ENCOUNTER — LAB VISIT (OUTPATIENT)
Dept: LAB | Facility: HOSPITAL | Age: 34
End: 2019-06-18
Attending: STUDENT IN AN ORGANIZED HEALTH CARE EDUCATION/TRAINING PROGRAM
Payer: MEDICAID

## 2019-06-18 DIAGNOSIS — Z85.850 HX OF PAPILLARY THYROID CARCINOMA: ICD-10-CM

## 2019-06-18 DIAGNOSIS — Z3A.01 LESS THAN 8 WEEKS GESTATION OF PREGNANCY: ICD-10-CM

## 2019-06-18 DIAGNOSIS — E89.0 H/O THYROIDECTOMY: ICD-10-CM

## 2019-06-18 LAB
T3 SERPL-MCNC: 126 NG/DL (ref 60–180)
T4 FREE SERPL-MCNC: 0.79 NG/DL (ref 0.71–1.51)
T4 SERPL-MCNC: 6.3 UG/DL (ref 4.5–11.5)
TSH SERPL DL<=0.005 MIU/L-ACNC: 0.36 UIU/ML (ref 0.4–4)

## 2019-06-18 PROCEDURE — 84446 ASSAY OF VITAMIN E: CPT

## 2019-06-18 PROCEDURE — 36415 COLL VENOUS BLD VENIPUNCTURE: CPT

## 2019-06-18 PROCEDURE — 82306 VITAMIN D 25 HYDROXY: CPT

## 2019-06-18 PROCEDURE — 82180 ASSAY OF ASCORBIC ACID: CPT

## 2019-06-18 PROCEDURE — 84425 ASSAY OF VITAMIN B-1: CPT

## 2019-06-18 PROCEDURE — 84480 ASSAY TRIIODOTHYRONINE (T3): CPT

## 2019-06-18 PROCEDURE — 84590 ASSAY OF VITAMIN A: CPT

## 2019-06-18 PROCEDURE — 84436 ASSAY OF TOTAL THYROXINE: CPT

## 2019-06-18 PROCEDURE — 82607 VITAMIN B-12: CPT

## 2019-06-18 PROCEDURE — 84597 ASSAY OF VITAMIN K: CPT

## 2019-06-18 PROCEDURE — 84443 ASSAY THYROID STIM HORMONE: CPT

## 2019-06-18 PROCEDURE — 84439 ASSAY OF FREE THYROXINE: CPT

## 2019-06-19 LAB
25(OH)D3+25(OH)D2 SERPL-MCNC: 20 NG/ML (ref 30–96)
VIT B12 SERPL-MCNC: 222 PG/ML (ref 210–950)

## 2019-06-21 LAB
A-TOCOPHEROL VIT E SERPL-MCNC: 975 UG/DL (ref 500–1800)
VIT A SERPL-MCNC: 41 UG/DL (ref 38–106)
VIT B1 BLD-MCNC: 34 UG/L (ref 38–122)
VIT C SERPL-MCNC: 4 MG/L (ref 2–19)

## 2019-06-24 LAB — PHYTONADIONE SERPL-MCNC: 1 NMOL/L (ref 0.22–4.88)

## 2019-07-15 ENCOUNTER — OFFICE VISIT (OUTPATIENT)
Dept: FAMILY MEDICINE | Facility: HOSPITAL | Age: 34
End: 2019-07-15
Attending: SPECIALIST
Payer: MEDICAID

## 2019-07-15 VITALS
DIASTOLIC BLOOD PRESSURE: 74 MMHG | BODY MASS INDEX: 43.98 KG/M2 | WEIGHT: 224 LBS | HEART RATE: 83 BPM | HEIGHT: 60 IN | SYSTOLIC BLOOD PRESSURE: 108 MMHG

## 2019-07-15 DIAGNOSIS — J01.00 ACUTE MAXILLARY SINUSITIS, RECURRENCE NOT SPECIFIED: Primary | ICD-10-CM

## 2019-07-15 PROCEDURE — 99213 OFFICE O/P EST LOW 20 MIN: CPT | Performed by: STUDENT IN AN ORGANIZED HEALTH CARE EDUCATION/TRAINING PROGRAM

## 2019-07-15 RX ORDER — CETIRIZINE HYDROCHLORIDE 10 MG/1
10 TABLET ORAL DAILY
Qty: 30 TABLET | Refills: 0 | Status: SHIPPED | OUTPATIENT
Start: 2019-07-15 | End: 2019-08-14

## 2019-07-15 RX ORDER — FLUTICASONE PROPIONATE 50 MCG
1 SPRAY, SUSPENSION (ML) NASAL DAILY
Qty: 9.9 ML | Refills: 0 | Status: SHIPPED | OUTPATIENT
Start: 2019-07-15 | End: 2019-07-25

## 2019-07-15 RX ORDER — ONDANSETRON HYDROCHLORIDE 8 MG/1
8 TABLET, FILM COATED ORAL
COMMUNITY

## 2019-07-15 RX ORDER — TRAZODONE HYDROCHLORIDE 50 MG/1
50 TABLET ORAL
COMMUNITY
End: 2019-07-18

## 2019-07-15 RX ORDER — AMOXICILLIN AND CLAVULANATE POTASSIUM 875; 125 MG/1; MG/1
1 TABLET, FILM COATED ORAL EVERY 12 HOURS
Qty: 14 TABLET | Refills: 0 | Status: SHIPPED | OUTPATIENT
Start: 2019-07-15 | End: 2019-07-22

## 2019-07-15 NOTE — PROGRESS NOTES
[facility] Clinic Note    Subjective:       Chief Complaint: Left-sided Sinus Pain urgent visit    History of Present Illness (HPI)  Ms. Rai is a 33 y/o female w/ pmhx of papillary thyroid carcinoma (thyroidectomy 2015) who presents to clinic w/ left-sided sinus pain. She is currently about 10 weeks pregnant with her 5th child. The patient reports that she had a reactive lymph node removed on the left side, and since then, she has experienced chronic left-sided sinus pain. However, the pain has worsened significantly since last week. She believes she has an infection. She denies any drainage. She has used a humidifier, essential oils, tylenol, and sudafed without relief. She was prescribed amoxicillin the last time she had a sinus infection, and she reports this worked well for her. She states that she has tried Flonase in the past, but this makes her gag and feel like vomiting. She normally takes hydroxyzine at night for pruritis all over her body, which she claims she experiences secondary to anxiety, however she discontinued taking it after finding out she was pregnant. The patient's next appointment in in 3 days (7/18/19) with Dr. Crews.    Denies N/V/F/C/CP/SOB/HA.      Review of Systems     Constitutional: Negative for fever and chills.  HEENT: + sinus HA.   Respiratory: Negative for cough and chest tightness.  Cardiovascular: Negative for chest pain and palpitations.  Gastrointestinal: Negative for constipation and diarrhea.  Genitourinary: Negative for dysuria and hematuria.   Musculoskeletal: Negative for arthralgias and myalgias.   Skin: Negative for rash.  Neurological: Negative for light-headedness.  Psychiatric/Behavioral: Negative for SI/HI.      Objective:     Vitals:    07/15/19 1012   BP: 108/74   Pulse: 83   Weight: 101.6 kg (223 lb 15.8 oz)   Height: 5' (1.524 m)     Constitutional: She is AAOx3.  NAD.   Head: Normocephalic and atraumatic.   Mouth/Throat:+erythema in the posterior  oropharynx consistent with post nasal drip w/o evidence of abscess. No uvula deviation. +nasal congestion w/ inflamed turbinates.  +cerumen present w/o impaction. No cervical reactive lymphadenopathy appreciated  Eyes: EOMI grossly and PERRLA.  Neck:  Neck supple.   Cardiovascular: Normal rate and intact distal pulses.   Pulmonary/Chest: no respiratory distress. CTAB.  Abdominal: Soft. Bowel sounds are present. She exhibits no distension, no tenderness, no rebound/guarding. No peritoneal signs.   Musculoskeletal:  She exhibits no edema.   Neurological: No focal neurological deficit appreciated.  Skin: Skin is warm and dry.  The patient is not diaphoretic.   Psychiatric:  Mood and affect are congruent.  Nursing note and vitals reviewed.     Assessment/Plan:       Acute maxillary sinusitis, recurrence not specified  Ordered amoxicillin-clavulanate 875-125mg (AUGMENTIN) 875-125 mg per tablet; Take 1 tablet by mouth every 12 (twelve) hours. for 7 days  Dispense: 14 tablet; Refill: 0; fluticasone propionate (FLONASE) 50 mcg/actuation nasal spray; 1 spray (50 mcg total) by Each Nare route once daily. for 10 days  Dispense: 9.9 mL; Refill: 0; and cetirizine (ZYRTEC) 10 MG tablet; Take 1 tablet (10 mg total) by mouth once daily.  Dispense: 30 tablet; Refill: 0  Encouraged to call the clinic for any additional questions or concerns. Also, encouraged to return to the clinic if her symptoms do not improve or worsen.     Follow up in about 3 days (around 7/18/2019) for PCP follow-up visit.    Case discussed with attending physician, Dr. Ernesto Reynolds Jr., D.O.  [facility], [facility]Y-2  [facility]

## 2019-07-16 NOTE — PATIENT INSTRUCTIONS
Understanding Sinus Problems    You dont often think about your sinuses until theres a problem. One day you realize you cant smell dinner cooking. Or you find you often have headaches or problems breathing through your nose.  Symptoms of sinus problems  Sinus problems can cause uncomfortable symptoms. Your nose may run constantly. You might have trouble sleeping at night. You may even lose your sense of smell. Other symptoms can include:  · Nasal congestion  · Fullness in ears  · Green, yellow, or bloody drainage from the nose  · Trouble tasting food  · Frequent headaches  · Facial pain  · Cough  When sinuses are blocked  If something blocks the passages in the nose or sinuses, mucus cant drain. Mucus-filled sinuses often become infected.  · Colds cause the lining of the nose and sinuses to swell and make extra mucus. A buildup of mucus can lead to a more serious infection.  · Allergies irritate turbinates and other tissues. This causes swelling, which can cause a blockage. Over time, this irritation can also lead to sacs of swollen tissue (polyps).  · Polyps may form in both the sinuses and nose. Polyps can grow large enough to clog nasal passages and block drainage.  · A crooked (deviated) septum may block nasal passages. This is often the result of an injury.  Date Last Reviewed: 11/1/2016  © 3215-6828 [employer]. [address] [place] [address], [place] [address]. All rights reserved. This information is not intended as a substitute for professional medical care. Always follow your healthcare professional's instructions.

## 2019-07-18 ENCOUNTER — OFFICE VISIT (OUTPATIENT)
Dept: FAMILY MEDICINE | Facility: HOSPITAL | Age: 34
End: 2019-07-18
Attending: FAMILY MEDICINE
Payer: MEDICAID

## 2019-07-18 VITALS
DIASTOLIC BLOOD PRESSURE: 68 MMHG | HEIGHT: 60 IN | BODY MASS INDEX: 44.27 KG/M2 | HEART RATE: 78 BPM | WEIGHT: 225.5 LBS | SYSTOLIC BLOOD PRESSURE: 109 MMHG

## 2019-07-18 DIAGNOSIS — E89.0 POSTOPERATIVE HYPOTHYROIDISM: ICD-10-CM

## 2019-07-18 DIAGNOSIS — J06.9 UPPER RESPIRATORY TRACT INFECTION, UNSPECIFIED TYPE: ICD-10-CM

## 2019-07-18 DIAGNOSIS — R11.0 NAUSEA: Primary | ICD-10-CM

## 2019-07-18 DIAGNOSIS — Z85.850 HX OF PAPILLARY THYROID CARCINOMA: ICD-10-CM

## 2019-07-18 PROCEDURE — 99213 OFFICE O/P EST LOW 20 MIN: CPT | Performed by: STUDENT IN AN ORGANIZED HEALTH CARE EDUCATION/TRAINING PROGRAM

## 2019-07-18 RX ORDER — PROMETHAZINE HYDROCHLORIDE 12.5 MG/1
12.5 TABLET ORAL 4 TIMES DAILY
Qty: 30 TABLET | Refills: 1 | Status: SHIPPED | OUTPATIENT
Start: 2019-07-18

## 2019-07-18 NOTE — PROGRESS NOTES
\[facility]\"" Family Practice Clinic Note    Subjective:       Chief Complaint: Sinus pain follow-up    History of Present Illness (HPI)  Ms. Rai is a 35 y/o female w/ pmhx of papillary thyroid carcinoma (thyroidectomy 2015) who presents to clinic as follow-up for left-sided sinus pain. She is currently about 10 weeks pregnant with her 5th child. The patient reports that she had a reactive lymph node removed on the left side, and since then, she has experienced chronic left-sided sinus pain. She is on day 3 of Augmentin for her sinuses.  She is feeling much better in regards to this.    She is supposed to be followed by both endocrinology, MFM and OB for her care. But she has not yet been seen by endocrinology due to difficulties with her insurance.  Dr. Li, the medicaid-preferred doctor would not prescribe her armour for her thyroid and pt was uncontrolled during prior trials of synthroid.  She is concerned for her well-being if the doctor does not continue the armour.    Approval number # [id number]. Spoke with Ryan LINARES With insurance company.    Denies N/V/F/C/CP/SOB/HA.      Review of Systems     Constitutional: Negative for fever and chills., sensitive to temperature  HEENT: + sinus HA.   Respiratory: Negative for cough and chest tightness.  Cardiovascular: Negative for chest pain and palpitations.  Gastrointestinal: Negative for constipation and diarrhea.  Genitourinary: Negative for dysuria and hematuria.   Musculoskeletal: Negative for arthralgias and myalgias.   Skin: Negative for rash.  Neurological: Negative for light-headedness.  Psychiatric/Behavioral: Negative for SI/HI.      Objective:     Vitals:    07/18/19 1059   BP: 109/68   Pulse: 78   Weight: 102.3 kg (225 lb 8.5 oz)   Height: 5' (1.524 m)     Constitutional: She is AAOx3.  NAD.   Head: Normocephalic and atraumatic.   Mouth/Throat:  +nasal congestion w/ inflamed turbinates.  +cerumen present w/o impaction. No cervical reactive lymphadenopathy  appreciated  Eyes: EOMI grossly and PERRLA.  Neck:  Neck supple.   Cardiovascular: Normal rate and intact distal pulses.   Pulmonary/Chest: no respiratory distress. CTAB.  Abdominal: Soft. Bowel sounds are present. She exhibits no distension, no tenderness, no rebound/guarding. No peritoneal signs.   Musculoskeletal:  She exhibits no edema.   Neurological: No focal neurological deficit appreciated.  Skin: Skin is warm and dry.  The patient is not diaphoretic.   Psychiatric:  Mood and affect are congruent.  Nursing note and vitals reviewed.     Assessment/Plan:       Post-op Hypothyroidism  -Talked to insurance company for prior authorization to see Dr. Yan out of network.  -Cont armour 180 mg daily, will likely need to titrate up due to pregnancy    Papillary Thyroid Carcinoma  S/p Thyroid resection however with remaining reactive lymph node on the right    Acute maxillary sinusitis, recurrence not specified  Cont amoxicillin-clavulanate 875-125mg (AUGMENTIN) 875-125 mg per tablet; Take 1 tablet by mouth every 12 (twelve) hours. for 7 days (ZYRTEC) 10 MG tablet; Take 1 tablet (10 mg total) daily    First trimester pregnancy  10 weeks, following with OB & MFM    Follow up in about 2 months (around 9/18/2019). or sooner if needed       Pradeep Crews MD  [facility],  [place], -3  [facility]

## 2019-07-26 NOTE — PROGRESS NOTES
I have reviewed the notes, assessments, and/or procedures performed, I concur with her/his documentation of Monique Rai.

## 2019-08-06 ENCOUNTER — OFFICE VISIT (OUTPATIENT)
Dept: FAMILY MEDICINE | Facility: HOSPITAL | Age: 34
End: 2019-08-06
Attending: FAMILY MEDICINE
Payer: MEDICAID

## 2019-08-06 VITALS
SYSTOLIC BLOOD PRESSURE: 110 MMHG | HEART RATE: 99 BPM | WEIGHT: 223.56 LBS | DIASTOLIC BLOOD PRESSURE: 69 MMHG | BODY MASS INDEX: 43.89 KG/M2 | HEIGHT: 60 IN

## 2019-08-06 DIAGNOSIS — O09.91 HIGH-RISK PREGNANCY IN FIRST TRIMESTER: Primary | ICD-10-CM

## 2019-08-06 DIAGNOSIS — K59.00 CONSTIPATION, UNSPECIFIED CONSTIPATION TYPE: ICD-10-CM

## 2019-08-06 DIAGNOSIS — F32.A DEPRESSION, UNSPECIFIED DEPRESSION TYPE: ICD-10-CM

## 2019-08-06 DIAGNOSIS — E89.0 POSTOPERATIVE HYPOTHYROIDISM: ICD-10-CM

## 2019-08-06 DIAGNOSIS — Z85.850 HX OF PAPILLARY THYROID CARCINOMA: ICD-10-CM

## 2019-08-06 PROCEDURE — 99214 OFFICE O/P EST MOD 30 MIN: CPT | Performed by: STUDENT IN AN ORGANIZED HEALTH CARE EDUCATION/TRAINING PROGRAM

## 2019-08-06 RX ORDER — POLYETHYLENE GLYCOL 3350 17 G/17G
17 POWDER, FOR SOLUTION ORAL DAILY PRN
Qty: 30 PACKET | Refills: 3 | Status: SHIPPED | OUTPATIENT
Start: 2019-08-06 | End: 2019-08-08

## 2019-08-06 RX ORDER — CYANOCOBALAMIN 1000 UG/ML
1000 INJECTION, SOLUTION INTRAMUSCULAR; SUBCUTANEOUS
Qty: 10 ML | Refills: 4 | Status: SHIPPED | OUTPATIENT
Start: 2019-08-06 | End: 2019-09-05

## 2019-08-06 RX ORDER — LEVOTHYROXINE SODIUM 200 UG/1
TABLET ORAL
Refills: 5 | COMMUNITY
Start: 2019-07-18

## 2019-08-06 RX ORDER — LIOTHYRONINE SODIUM 25 UG/1
TABLET ORAL
Refills: 5 | COMMUNITY
Start: 2019-07-18 | End: 2019-08-08

## 2019-08-06 RX ORDER — SERTRALINE HYDROCHLORIDE 25 MG/1
25 TABLET, FILM COATED ORAL DAILY
Qty: 30 TABLET | Refills: 1 | Status: SHIPPED | OUTPATIENT
Start: 2019-08-06 | End: 2020-12-10

## 2019-08-06 RX ORDER — ALPRAZOLAM 0.5 MG/1
0.5 TABLET ORAL
COMMUNITY
End: 2019-08-08

## 2019-08-06 NOTE — PROGRESS NOTES
[facility] Clinic Note    Subjective:       Chief Complaint:   Chief Complaint           Hypothyroidism in pregnancy     History of Present Illness (HPI)  Ms. Rai is a 35 y/o female w/ pmhx of papillary thyroid carcinoma (thyroidectomy 2015) who presents to clinic for continued sinus pain. She completed her course of Augmentin without relief. She has an ENT appointment on . She denies any fevers or systemic signs of a current infectious etiology. She is currently approximately 12-13 weeks pregnant with her 5th child. Her previous 4 children were developed via . She is high risk due to her hypothyroid and 4 previous C/S. During this pregnancy her TSH was ~>80 and most recent is ~10. She is followed by both endocrinology, MFM and OB for her care. Her next endocrinology appointment is next week. Due to the previous TSH >80 there is concern for her current pregnancy. Denies N/V/F/C/CP/SOB/HA.      Review of Systems     Constitutional: Negative for fever and chills., sensitive to temperature  HEENT: + sinus HA.   Respiratory: Negative for cough and chest tightness.  Cardiovascular: Negative for chest pain and palpitations.  Gastrointestinal: Negative for constipation and diarrhea.  Genitourinary: Negative for dysuria and hematuria.   Musculoskeletal: Negative for arthralgias and myalgias.   Skin: Negative for rash.  Neurological: Negative for light-headedness.  Psychiatric/Behavioral: Negative for SI/HI.     Objective:     Vitals:    19 1135   BP: 110/69   BP Location: Right arm   Patient Position: Sitting   BP Method: Large (Automatic)   Pulse: 99   Weight: 101.4 kg (223 lb 8.7 oz)   Height: 5' (1.524 m)     Constitutional: She is AAOx3.  NAD.   Head: Normocephalic and atraumatic.   Mouth/Throat:  +nasal congestion w/ inflamed turbinates.  +cerumen present w/o impaction. No cervical reactive lymphadenopathy appreciated  Eyes: EOMI grossly and PERRLA.  Neck:  Neck supple.    Cardiovascular: Normal rate and intact distal pulses.   Pulmonary/Chest: no respiratory distress. CTAB.  Abdominal: Soft. Bowel sounds are present. She exhibits no distension, no tenderness, no rebound/guarding. No peritoneal signs.   Musculoskeletal:  She exhibits no edema.   Neurological: No focal neurological deficit appreciated.  Skin: Skin is warm and dry.  The patient is not diaphoretic.   Psychiatric:  Mood and affect are congruent.  Nursing note and vitals reviewed.     Assessment/Plan:     High-risk pregnancy in first trimester due to Postoperative hypothyroidism secondary to history of papillary thyroid carcinoma  Encouraged to maintain her OB, Endocrinology, MFM, and ENT appointments  Will  Speak to her insurance company for prior authorization to see Dr. Yan out of network when necessary.   Cont armour and closely monitor her TSH/T4/T3 levels  Encouraged to call the clinic for any additional questions or concerns.      Depression, unspecified depression type  Ordered sertraline (ZOLOFT) 25 MG tablet; Take 1 tablet (25 mg total) by mouth once daily.  Dispense: 30 tablet; Refill: 1  Ambulatory referral to Psychology for counseling regarding her current life stressors as well as coping skills  No SI/HI  Encouraged to call the clinic for any additional questions or concerns. Also, encouraged to return to the clinic if her symptoms do not improve or worsen.      Constipation, unspecified constipation type  Continue medical management with polyethylene glycol (GLYCOLAX) 17 gram PwPk; Take 17 g by mouth daily as needed.  Dispense: 30 packet; Refill: 3    Follow up in about 1 week (around 8/13/2019).    Case discussed with attending physician, Dr. Faisal Reynolds Jr., D.O.  [facility], PGY-[facility]  [facility]

## 2019-08-08 ENCOUNTER — OFFICE VISIT (OUTPATIENT)
Dept: OTOLARYNGOLOGY | Facility: CLINIC | Age: 34
End: 2019-08-08
Payer: MEDICAID

## 2019-08-08 VITALS
WEIGHT: 222.25 LBS | BODY MASS INDEX: 43.4 KG/M2 | TEMPERATURE: 98 F | DIASTOLIC BLOOD PRESSURE: 74 MMHG | SYSTOLIC BLOOD PRESSURE: 116 MMHG | HEART RATE: 104 BPM

## 2019-08-08 DIAGNOSIS — R04.0 EPISTAXIS: ICD-10-CM

## 2019-08-08 DIAGNOSIS — J31.0 RHINITIS, UNSPECIFIED TYPE: Primary | ICD-10-CM

## 2019-08-08 PROCEDURE — 99999 PR PBB SHADOW E&M-EST. PATIENT-LVL III: ICD-10-PCS | Mod: PBBFAC,,, | Performed by: OTOLARYNGOLOGY

## 2019-08-08 PROCEDURE — 30901 CONTROL OF NOSEBLEED: CPT | Mod: PBBFAC,LT | Performed by: OTOLARYNGOLOGY

## 2019-08-08 PROCEDURE — 30801 ABLATE INF TURBINATE SUPERF: CPT | Mod: PBBFAC | Performed by: OTOLARYNGOLOGY

## 2019-08-08 PROCEDURE — 99213 PR OFFICE/OUTPT VISIT, EST, LEVL III, 20-29 MIN: ICD-10-PCS | Mod: 25,S$PBB,, | Performed by: OTOLARYNGOLOGY

## 2019-08-08 PROCEDURE — 99999 PR PBB SHADOW E&M-EST. PATIENT-LVL III: CPT | Mod: PBBFAC,,, | Performed by: OTOLARYNGOLOGY

## 2019-08-08 PROCEDURE — 31231 NASAL ENDOSCOPY DX: CPT | Mod: PBBFAC | Performed by: OTOLARYNGOLOGY

## 2019-08-08 PROCEDURE — 99213 OFFICE O/P EST LOW 20 MIN: CPT | Mod: 25,S$PBB,, | Performed by: OTOLARYNGOLOGY

## 2019-08-08 PROCEDURE — 99213 OFFICE O/P EST LOW 20 MIN: CPT | Mod: PBBFAC | Performed by: OTOLARYNGOLOGY

## 2019-08-08 PROCEDURE — 30901 PR CTRL 2SEBLEED,ANTER,SIMPLE: ICD-10-PCS | Mod: S$PBB,LT,, | Performed by: OTOLARYNGOLOGY

## 2019-08-08 PROCEDURE — 30901 CONTROL OF NOSEBLEED: CPT | Mod: S$PBB,LT,, | Performed by: OTOLARYNGOLOGY

## 2019-08-08 NOTE — ASSESSMENT & PLAN NOTE
Likely multifactorial.  Possibly due, in part, to pregnancy.  I urged her to moisten her nose with saline spray and gel.  I cauterized prominent vessels around an excoriated area in the anterior aspect of her left nasal septum.  She is return to clinic on an as-needed basis.

## 2019-08-08 NOTE — PROGRESS NOTES
Chief Complaint   Patient presents with    sinus issues, headaches, pressure in face, pain in left ear        HPI   34 y.o. female presents for evaluation of left facial pain, left ear pain and intermittent epistaxis.  She reports she was treated with a course of antibiotics with little improvement.  She also reports that she has used saline gel but is bothered by the sensation her nose. She is currently in the 1st trimester of pregnancy.  She reports the symptoms may have worsened after she became pregnant.    Review of Systems   Constitutional: Negative for fatigue and unexpected weight change.   HENT: Per HPI.  Eyes: Negative for visual disturbance.   Respiratory: Negative for shortness of breath, hemoptysis   Cardiovascular: Negative for chest pain and palpitations.   Musculoskeletal: Negative for decreased ROM, back pain.   Skin: Negative for rash, sunburn, itching.   Neurological: Negative for dizziness and seizures.   Hematological: Negative for adenopathy. Does not bruise/bleed easily.   Endocrine: Negative for rapid weight loss/weight gain, heat/cold intolerance.     Past Medical History   Patient Active Problem List   Diagnosis    Neck pain    Pharyngitis    Thrombosed external hemorrhoid    Rhinitis    URI (upper respiratory infection)    Anxiety and depression    Fatigue    Perineal rash in female    Allergic rhinitis    Hx of papillary thyroid carcinoma    H/O thyroidectomy    .    Postoperative hypothyroidism    Mastitis    Pyelonephritis    Malaise    Lymphadenopathy, cervical    High-risk pregnancy in first trimester    Depression           Past Surgical History   Past Surgical History:   Procedure Laterality Date    BIOPSY-LYMPH NODE Left 2017    [facility]     SECTION      STOMACH SURGERY      THYROIDECTOMY           Family History   Family History   Problem Relation Age of Onset    No Known Problems Mother     No  Known Problems Father            Social History   .  Social History     Socioeconomic History    Marital status:      Spouse name: Not on file    Number of children: 1    Years of education: Not on file    Highest education level: Not on file   Occupational History    Not on file   Social Needs    Financial resource strain: Not on file    Food insecurity:     Worry: Not on file     Inability: Not on file    Transportation needs:     Medical: Not on file     Non-medical: Not on file   Tobacco Use    Smoking status: Former Smoker     Packs/day: 0.50     Years: 3.00     Pack years: 1.50    Smokeless tobacco: Never Used   Substance and Sexual Activity    Alcohol use: No    Drug use: No    Sexual activity: Yes     Partners: Male   Lifestyle    Physical activity:     Days per week: Not on file     Minutes per session: Not on file    Stress: Not on file   Relationships    Social connections:     Talks on phone: Not on file     Gets together: Not on file     Attends [employer] service: Not on file     Active member of club or organization: Not on file     Attends meetings of clubs or organizations: Not on file     Relationship status: Not on file   Other Topics Concern    Not on file   Social History Narrative    Not on file         Allergies   Review of patient's allergies indicates:  No Known Allergies        Physical Exam     Vitals:    08/08/19 1431   BP: 116/74   Pulse: 104   Temp: 98.3 °F (36.8 °C)         Body mass index is 43.4 kg/m².      General: AOx3, NAD   Respiratory:  Symmetric chest rise, normal effort  Right Ear: External Auditory Canal WNL,TM w/o masses/lesions/perforations.  Middle ear without evidence of effusion.   Left Ear: External Auditory Canal WNL,TM w/o masses/lesions/perforations.  Middle ear without evidence of effusion.   Nose: No gross nasal septal deviation. Left anterior septum excoriated.  Prominent vessels around the edges of the excoriation cauterized with silver  nitrate.  Inferior Turbinates WNL bilaterally. No septal perforation. No masses/lesions.   Oral Cavity:  Oral Tongue mobile, no lesions noted. Hard Palate WNL. No buccal or FOM lesions.  Oropharynx:  No masses/lesions of the posterior pharyngeal wall. Tonsillar fossa without lesions. Soft palate without masses. Midline uvula.   Neck: No scars.  No cervical lymphadenopathy, thyromegaly or thyroid nodules.  Normal range of motion.    Face: House Brackmann I bilaterally.     Nasal Nasoph Endocope Procedures #4    Procedure:  Diagnostic nasal and nasopharyngoscopy with endoscope:    Routine preparation with local atomizer with 1% Endy-Synephrine/Pontocaine with customary zero degree and/or 30 degree Storz endoscope:    NOSE:     External:  No gross deformity.   Intranasal:    Mucosa:  No polyps, ulcers or lesions.    Septum:  No gross deformity.    Turbinates:  Not enlarged.   Nasopharynx:  No lesions.    Mucosa:  No lesions.    Adenoids:  Present.    Posterior Choanae:  Patent.    Eustachian Tubes:  Patent.      Assessment/Plan  Problem List Items Addressed This Visit        ENT    Epistaxis    Rhinitis - Primary     Likely multifactorial.  Possibly due, in part, to pregnancy.  I urged her to moisten her nose with saline spray and gel.  I cauterized prominent vessels around an excoriated area in the anterior aspect of her left nasal septum.  She is return to clinic on an as-needed basis.

## 2019-09-17 ENCOUNTER — PATIENT MESSAGE (OUTPATIENT)
Dept: FAMILY MEDICINE | Facility: HOSPITAL | Age: 34
End: 2019-09-17

## 2019-09-17 ENCOUNTER — OFFICE VISIT (OUTPATIENT)
Dept: FAMILY MEDICINE | Facility: HOSPITAL | Age: 34
End: 2019-09-17
Attending: FAMILY MEDICINE
Payer: MEDICAID

## 2019-09-17 VITALS
BODY MASS INDEX: 42.52 KG/M2 | WEIGHT: 231.06 LBS | SYSTOLIC BLOOD PRESSURE: 115 MMHG | DIASTOLIC BLOOD PRESSURE: 81 MMHG | HEART RATE: 101 BPM | HEIGHT: 62 IN

## 2019-09-17 DIAGNOSIS — Z85.850 HX OF PAPILLARY THYROID CARCINOMA: ICD-10-CM

## 2019-09-17 DIAGNOSIS — E89.0 H/O THYROIDECTOMY: ICD-10-CM

## 2019-09-17 DIAGNOSIS — O09.92 HIGH-RISK PREGNANCY IN SECOND TRIMESTER: Primary | ICD-10-CM

## 2019-09-17 DIAGNOSIS — F41.9 ANXIETY AND DEPRESSION: ICD-10-CM

## 2019-09-17 DIAGNOSIS — F32.A ANXIETY AND DEPRESSION: ICD-10-CM

## 2019-09-17 PROCEDURE — 99213 OFFICE O/P EST LOW 20 MIN: CPT | Performed by: STUDENT IN AN ORGANIZED HEALTH CARE EDUCATION/TRAINING PROGRAM

## 2019-09-17 NOTE — PROGRESS NOTES
Subjective:       Patient ID: Monique Rai is a 34 y.o. female.    Chief Complaint:low bp     HPI   The pt reports low bp a couple of days ago measured at home was 70/30, called [facility] will call Dr. Reynolds. She has questions about aspirin for preventing pre-eclampsia, because she said her bp was high before so her mfm put her on aspirin. She denies vaginal bleeding, dc/ leakage of fluid. She has good and active fetal movements. She will visit her OBGYN tomorrow. She was told maybe she was dehydrated. She was told to go to L&D but didn't feel like she needed to go. She has no physical complaints today.    Review of Systems   Constitutional: Negative for activity change, appetite change, chills, diaphoresis, fatigue and fever.   HENT: Negative for congestion, hearing loss, sinus pressure, sinus pain and sneezing.    Eyes: Negative for visual disturbance.   Respiratory: Negative for apnea, cough, chest tightness, shortness of breath and wheezing.    Cardiovascular: Negative for chest pain, palpitations and leg swelling.   Gastrointestinal: Negative for abdominal distention, abdominal pain, anal bleeding, blood in stool, constipation, diarrhea, nausea and vomiting.   Endocrine: Negative for polyuria.   Genitourinary: Negative for difficulty urinating, dysuria and hematuria.   Musculoskeletal: Negative for arthralgias and back pain.   Skin: Negative for color change and wound.   Neurological: Negative for weakness and headaches.   Psychiatric/Behavioral: The patient is not nervous/anxious.          Objective:      Vitals:    09/17/19 1510   BP: 115/81   Pulse: 101     Physical Exam   Constitutional: She is oriented to person, place, and time. She appears well-developed and well-nourished.   HENT:   Head: Normocephalic and atraumatic.   Nose: Nose normal.   Eyes: Conjunctivae and EOM are normal. Right eye exhibits no discharge. Left eye exhibits no discharge. No scleral icterus.   Neck: Normal range of  motion. Neck supple. No JVD present.   Cardiovascular: Regular rhythm, normal heart sounds and intact distal pulses. Exam reveals no gallop and no friction rub.   No murmur heard.  Pulmonary/Chest: Effort normal and breath sounds normal. No respiratory distress. She has no wheezes. She has no rales. She exhibits no tenderness.   Abdominal: Soft. Bowel sounds are normal. She exhibits no distension and no mass. There is no tenderness. There is no rebound and no guarding.   Musculoskeletal: Normal range of motion. She exhibits no edema, tenderness or deformity.   Neurological: She is alert and oriented to person, place, and time.   Skin: Skin is warm. Capillary refill takes less than 2 seconds. No rash noted. She is not diaphoretic. No erythema. No pallor.   Psychiatric: She has a normal mood and affect.   Nursing note and vitals reviewed.      Assessment:       1. High-risk pregnancy in second trimester    2. Hx of papillary thyroid carcinoma    3. H/O thyroidectomy    4. Anxiety and depression        Plan:       High-risk pregnancy in second trimester  cnt aspirin  Vitals stable  No sign of preeclampsia    Hx of papillary thyroid carcinoma  See endocrin  H/O thyroidectomy  See endocrin  Anxiety and depression  cnt current meds and managed by own pcp.    Follow up in about 1 week (around 9/24/2019), or w/ own pcp.    Advise pt to go to [facility] if she feels dehydrated, low bp, vaginal c/o, decreased fetal movement or n/v. Spoke w/ Dr. Reynolds, he has not gotten any update from her MFM or OBGYN. Advise pt to f/u w/ own obgyn and MFM, she can send them message on epic as well or give them a call.

## 2019-09-18 NOTE — PROGRESS NOTES
I have reviewed the notes, assessments, and/or procedures performed by Dr. Baum, I concur with her/his documentation of Monique Rai.

## 2019-11-06 ENCOUNTER — TELEPHONE (OUTPATIENT)
Dept: DERMATOLOGY | Facility: CLINIC | Age: 34
End: 2019-11-06

## 2019-11-06 NOTE — TELEPHONE ENCOUNTER
----- Message from Becky Colvin sent at 11/6/2019 11:12 AM CST -----  Contact: Pt#[phone]  Patient calling for sooner appt due to pregnancy. Please call

## 2019-12-05 ENCOUNTER — TELEPHONE (OUTPATIENT)
Dept: OTOLARYNGOLOGY | Facility: CLINIC | Age: 34
End: 2019-12-05

## 2019-12-05 NOTE — TELEPHONE ENCOUNTER
"----- Message from Bashir Olvera MD sent at 12/5/2019  1:23 PM CST -----  Does she have labs that we can review?  I don't see any recent labs in our record, media or care everywhere.   ----- Message -----  From: Candy Hatfield RN  Sent: 12/5/2019   1:17 PM CST  To: Barbara Cueva RN, Bashir Olvera MD    So, I called the patient & she said that she doesn't have an ear infection & doesn't need to see an ear doctor. She said that she called because she wanted Dr. Olvera to know that her tumor marker #'s are elevated.   Don't know where the ear infection came from.  ----- Message -----  From: Barbara Cueva RN  Sent: 12/5/2019   1:08 PM CST  To: Candy Hatfield RN, [facility] Staff    Can you call this patient and reschedule her with Dr. Mcguire for recurrent ear infections?   Moon Burleson    ----- Message -----  From: Bashir Olvera MD  Sent: 12/5/2019  11:57 AM CST  To: Barbara Cueva RN    Ear doc  ----- Message -----  From: Barbara Cueva RN  Sent: 12/5/2019  11:54 AM CST  To: Bashir Olvera MD    Scheduled with you for "re-occurring ear infections", but is est patient of your's, with thyroid ca history.  Any concern that ear symptoms are related to cancer re-occurrence or incorrectly scheduled, and should see ear doc??    Thanks,  Moon          "

## 2019-12-19 ENCOUNTER — OFFICE VISIT (OUTPATIENT)
Dept: OTOLARYNGOLOGY | Facility: CLINIC | Age: 34
End: 2019-12-19
Payer: MEDICAID

## 2019-12-19 VITALS
WEIGHT: 247.56 LBS | HEART RATE: 123 BPM | SYSTOLIC BLOOD PRESSURE: 136 MMHG | DIASTOLIC BLOOD PRESSURE: 84 MMHG | BODY MASS INDEX: 45.28 KG/M2 | TEMPERATURE: 98 F

## 2019-12-19 DIAGNOSIS — Z85.850 HX OF PAPILLARY THYROID CARCINOMA: Primary | ICD-10-CM

## 2019-12-19 PROCEDURE — 99213 OFFICE O/P EST LOW 20 MIN: CPT | Mod: PBBFAC | Performed by: OTOLARYNGOLOGY

## 2019-12-19 PROCEDURE — 99999 PR PBB SHADOW E&M-EST. PATIENT-LVL III: CPT | Mod: PBBFAC,,, | Performed by: OTOLARYNGOLOGY

## 2019-12-19 PROCEDURE — 99499 UNLISTED E&M SERVICE: CPT | Mod: S$PBB,,, | Performed by: OTOLARYNGOLOGY

## 2019-12-19 PROCEDURE — 99499 NO LOS: ICD-10-PCS | Mod: S$PBB,,, | Performed by: OTOLARYNGOLOGY

## 2019-12-19 PROCEDURE — 99999 PR PBB SHADOW E&M-EST. PATIENT-LVL III: ICD-10-PCS | Mod: PBBFAC,,, | Performed by: OTOLARYNGOLOGY

## 2019-12-19 RX ORDER — LEVOTHYROXINE SODIUM 25 UG/1
25 TABLET ORAL DAILY
COMMUNITY
End: 2020-12-10

## 2020-12-10 ENCOUNTER — OFFICE VISIT (OUTPATIENT)
Dept: URGENT CARE | Facility: CLINIC | Age: 35
End: 2020-12-10

## 2020-12-10 VITALS
OXYGEN SATURATION: 100 % | TEMPERATURE: 99 F | HEART RATE: 88 BPM | SYSTOLIC BLOOD PRESSURE: 133 MMHG | DIASTOLIC BLOOD PRESSURE: 80 MMHG | HEIGHT: 62 IN | WEIGHT: 225 LBS | BODY MASS INDEX: 41.41 KG/M2 | RESPIRATION RATE: 15 BRPM

## 2020-12-10 DIAGNOSIS — R09.82 PND (POST-NASAL DRIP): Primary | ICD-10-CM

## 2020-12-10 DIAGNOSIS — Z13.9 ENCOUNTER FOR SCREENING: ICD-10-CM

## 2020-12-10 LAB
CTP QC/QA: YES
SARS-COV-2 RDRP RESP QL NAA+PROBE: NEGATIVE

## 2020-12-10 PROCEDURE — 99203 PR OFFICE/OUTPT VISIT, NEW, LEVL III, 30-44 MIN: ICD-10-PCS | Mod: TIER,S$GLB,, | Performed by: INTERNAL MEDICINE

## 2020-12-10 PROCEDURE — U0002: ICD-10-PCS | Mod: QW,S$GLB,, | Performed by: INTERNAL MEDICINE

## 2020-12-10 PROCEDURE — U0002 COVID-19 LAB TEST NON-CDC: HCPCS | Mod: QW,S$GLB,, | Performed by: INTERNAL MEDICINE

## 2020-12-10 PROCEDURE — 99203 OFFICE O/P NEW LOW 30 MIN: CPT | Mod: TIER,S$GLB,, | Performed by: INTERNAL MEDICINE

## 2020-12-10 RX ORDER — CYCLOBENZAPRINE HCL 10 MG
10 TABLET ORAL
COMMUNITY
Start: 2020-12-04 | End: 2020-12-14

## 2020-12-10 RX ORDER — LEVOCETIRIZINE DIHYDROCHLORIDE 5 MG/1
TABLET, FILM COATED ORAL
COMMUNITY
Start: 2020-11-25

## 2020-12-10 RX ORDER — HYDROXYZINE HYDROCHLORIDE 25 MG/1
25 TABLET, FILM COATED ORAL
COMMUNITY
Start: 2020-12-04

## 2020-12-10 RX ORDER — SULFAMETHOXAZOLE AND TRIMETHOPRIM 800; 160 MG/1; MG/1
1 TABLET ORAL DAILY
COMMUNITY
Start: 2020-11-23

## 2020-12-10 RX ORDER — ERGOCALCIFEROL 1.25 MG/1
50000 CAPSULE ORAL
COMMUNITY
Start: 2020-02-10

## 2020-12-10 NOTE — PROGRESS NOTES
"Subjective:       Patient ID: Monique Rai is a 35 y.o. female.    Vitals:  height is 5' 2" (1.575 m) and weight is 102.1 kg (225 lb). Her temperature is 98.5 °F (36.9 °C). Her blood pressure is 133/80 and her pulse is 88. Her respiration is 15 and oxygen saturation is 100%.     Chief Complaint: Sinus Problem    This is a 35 y.o. female who presents today with a chief complaint of   Cough, sinus congestion, post nasal drip. Pt sx started yesterday. Pt was exposed     Sinus Problem  This is a new problem. The current episode started yesterday. The problem has been gradually worsening since onset. There has been no fever. She is experiencing no pain. Associated symptoms include congestion, sinus pressure and a sore throat. Pertinent negatives include no chills, coughing, diaphoresis, ear pain, headaches or shortness of breath. Past treatments include nothing. The treatment provided no relief.       Constitution: Negative for chills, sweating, fatigue and fever.   HENT: Positive for congestion, postnasal drip, sinus pressure and sore throat. Negative for ear pain, sinus pain and voice change.    Neck: Negative for painful lymph nodes.   Eyes: Negative for eye redness.   Respiratory: Negative for chest tightness, cough, sputum production, bloody sputum, COPD, shortness of breath, stridor, wheezing and asthma.    Gastrointestinal: Negative for nausea, vomiting and diarrhea.   Musculoskeletal: Negative for muscle ache.   Skin: Negative for rash.   Allergic/Immunologic: Negative for seasonal allergies and asthma.   Neurological: Negative for headaches.   Hematologic/Lymphatic: Negative for swollen lymph nodes.       Objective:      Physical Exam   Constitutional: She is oriented to person, place, and time.  Non-toxic appearance. She does not appear ill. No distress.   HENT:   Head: Normocephalic and atraumatic.   Ears:   Right Ear: External ear normal.   Left Ear: External ear normal.   Nose: Nose normal. No " congestion.   Mouth/Throat: Mucous membranes are moist.   Pulmonary/Chest: No respiratory distress.   Abdominal: Normal appearance.   Neurological: She is alert and oriented to person, place, and time.   Skin: Skin is warm, dry and not diaphoretic. Psychiatric: Her behavior is normal. Mood, judgment and thought content normal.   Vitals reviewed.        Assessment:       1. PND (post-nasal drip)    2. Encounter for screening        Plan:         PND (post-nasal drip)  -     POCT COVID-19 Rapid Screening    Encounter for screening    Patient negative for COVID without close exposure as defined by the CDC. Patient is being treated for sinus infection by PCP and was instructed by PCP to call back tomorrow to discuss further treatment options. Patient will plan to do this.

## 2020-12-10 NOTE — PATIENT INSTRUCTIONS
"You have tested negative for COVID-19 today.  If you did not have a close exposure (as defined below) you can return to your normal daily activities to include social distancing, wearing a mask and frequent handwashing.  A "close exposure" is defined as anyone who has had an exposure (masked or unmasked) to a known COVID -19 positive person within 6 ft for longer than 15 minutes. If your exposure meets this definition, you are required by CDC guidelines to quarantine for at least 7-10 days from time of exposure.  The CDC states that a test can be performed for an asymptomatic patient (someone who does not have any symptoms) after a close exposure, and that a test should be done if you develop symptoms after a close exposure as described above.  Specifically, you can test at day 5 or later if asymptomatic in order to get released from quarantine on day 7 or later.  If you develop symptoms sooner, you should test when your symptoms start.  If you developed symptoms since the exposure, and your test was negative today and less than 5 days from your exposure, you still have to quarantine for 7-10 days from the date of the exposure.  The 7-10 day quarantine begins from the day you were exposed, not the day of your test.  For example, if your exposure was on a Monday, and you waited until Friday of the same week to get tested and it was negative, your 7-10 day quarantine begins from that Monday, not the Friday you tested negative.  Please note, if you decide to test as an asymptomatic during your quarantine and you are positive, you will be restarting your quarantine and moving from a possible 10 day quarantine (if you do not test), to a 11 day or greater quarantine.  "
Other
Other

## 2020-12-13 ENCOUNTER — PATIENT MESSAGE (OUTPATIENT)
Dept: ADMINISTRATIVE | Facility: OTHER | Age: 35
End: 2020-12-13

## 2020-12-13 ENCOUNTER — OFFICE VISIT (OUTPATIENT)
Dept: URGENT CARE | Facility: CLINIC | Age: 35
End: 2020-12-13
Payer: MEDICAID

## 2020-12-13 ENCOUNTER — NURSE TRIAGE (OUTPATIENT)
Dept: ADMINISTRATIVE | Facility: CLINIC | Age: 35
End: 2020-12-13

## 2020-12-13 VITALS
RESPIRATION RATE: 18 BRPM | HEART RATE: 97 BPM | WEIGHT: 235 LBS | HEIGHT: 62 IN | BODY MASS INDEX: 43.24 KG/M2 | TEMPERATURE: 99 F | OXYGEN SATURATION: 96 % | SYSTOLIC BLOOD PRESSURE: 135 MMHG | DIASTOLIC BLOOD PRESSURE: 84 MMHG

## 2020-12-13 DIAGNOSIS — Z20.822 EXPOSURE TO COVID-19 VIRUS: ICD-10-CM

## 2020-12-13 DIAGNOSIS — U07.1 COVID-19 VIRUS INFECTION: Primary | ICD-10-CM

## 2020-12-13 LAB
CTP QC/QA: YES
SARS-COV-2 RDRP RESP QL NAA+PROBE: POSITIVE

## 2020-12-13 PROCEDURE — 99214 PR OFFICE/OUTPT VISIT, EST, LEVL IV, 30-39 MIN: ICD-10-PCS | Mod: S$GLB,,, | Performed by: NURSE PRACTITIONER

## 2020-12-13 PROCEDURE — U0002 COVID-19 LAB TEST NON-CDC: HCPCS | Mod: QW,S$GLB,, | Performed by: NURSE PRACTITIONER

## 2020-12-13 PROCEDURE — 99214 OFFICE O/P EST MOD 30 MIN: CPT | Mod: S$GLB,,, | Performed by: NURSE PRACTITIONER

## 2020-12-13 PROCEDURE — U0002: ICD-10-PCS | Mod: QW,S$GLB,, | Performed by: NURSE PRACTITIONER

## 2020-12-13 NOTE — PROGRESS NOTES
"Subjective:       Patient ID: Monique Rai is a 35 y.o. female.    Vitals:  height is 5' 2" (1.575 m) and weight is 106.6 kg (235 lb). Her temperature is 98.8 °F (37.1 °C). Her blood pressure is 135/84 and her pulse is 97. Her respiration is 18 and oxygen saturation is 96%.     Chief Complaint: Cough    This is a 35 y.o. female with history of anxiety and depression, history of papillary thyroid carcinoma who presents today with a chief complaint of  cough due to postnasal drip, sore throat, fatigue, sinus pressure and head ache started Wednesday, patient denies fever, body aches or chills, denies  wheezing or shortness of breath, denies nausea, vomiting, diarrhea or abdominal pain, denies chest pain or dizziness positional lightheadedness, denies  trouble swallowing, denies loss of taste or smell, or any other symptoms, patient reports she is immunocompromised due to of history of cancer      Cough  This is a new problem. The current episode started in the past 7 days. The problem has been gradually worsening. The cough is productive of brown sputum. Associated symptoms include headaches and a sore throat. Pertinent negatives include no chest pain, chills, fever, myalgias, rash or shortness of breath. Nothing aggravates the symptoms. Treatments tried: tylenol and ibuprofen. The treatment provided no relief.       Constitution: Positive for fatigue. Negative for chills and fever.   HENT: Positive for congestion, sinus pressure and sore throat.    Neck: Negative for painful lymph nodes.   Cardiovascular: Negative for chest pain and leg swelling.   Eyes: Negative for double vision and blurred vision.   Respiratory: Positive for cough. Negative for shortness of breath.    Gastrointestinal: Positive for nausea. Negative for vomiting and diarrhea.   Genitourinary: Negative for dysuria, frequency, urgency and history of kidney stones.   Musculoskeletal: Negative for joint pain, joint swelling, muscle cramps and " muscle ache.   Skin: Negative for color change, pale, rash and bruising.   Allergic/Immunologic: Negative for seasonal allergies.   Neurological: Positive for headaches. Negative for dizziness, history of vertigo, light-headedness and passing out.   Hematologic/Lymphatic: Negative for swollen lymph nodes.   Psychiatric/Behavioral: Negative for nervous/anxious, sleep disturbance and depression. The patient is not nervous/anxious.        Objective:      Physical Exam   Constitutional: She is oriented to person, place, and time. She appears well-developed. She is cooperative.  Non-toxic appearance. She does not appear ill. No distress.   HENT:   Head: Normocephalic and atraumatic.   Ears:   Right Ear: Hearing, tympanic membrane, external ear and ear canal normal.   Left Ear: Hearing, tympanic membrane, external ear and ear canal normal.   Nose: Nose normal. No mucosal edema, rhinorrhea or nasal deformity. No epistaxis. Right sinus exhibits no maxillary sinus tenderness and no frontal sinus tenderness. Left sinus exhibits no maxillary sinus tenderness and no frontal sinus tenderness.   Mouth/Throat: Uvula is midline, oropharynx is clear and moist and mucous membranes are normal. No trismus in the jaw. Normal dentition. No uvula swelling. No oropharyngeal exudate, posterior oropharyngeal edema, posterior oropharyngeal erythema, tonsillar abscesses or cobblestoning.   Eyes: Conjunctivae and lids are normal. No scleral icterus.   Neck: Trachea normal, full passive range of motion without pain and phonation normal. Neck supple. No neck rigidity. No edema and no erythema present.   Cardiovascular: Normal rate, regular rhythm, normal heart sounds and normal pulses.   Pulmonary/Chest: Effort normal and breath sounds normal. No stridor. No respiratory distress. She has no decreased breath sounds. She has no wheezes. She has no rhonchi. She has no rales.   Abdominal: Normal appearance.   Musculoskeletal: Normal range of motion.          General: No deformity.   Lymphadenopathy:     She has no cervical adenopathy.        Right cervical: No superficial cervical adenopathy present.       Left cervical: No superficial cervical adenopathy present.   Neurological: She is alert and oriented to person, place, and time. She exhibits normal muscle tone. Coordination normal.   Skin: Skin is warm, dry, intact, not diaphoretic and not pale. Psychiatric: Her speech is normal and behavior is normal. Judgment and thought content normal.   Nursing note and vitals reviewed.        Results for orders placed or performed in visit on 12/13/20   POCT COVID-19 Rapid Screening   Result Value Ref Range    POC Rapid COVID Positive (A) Negative     Acceptable Yes          Patient in no acute distress.  Vitals reassuring.  Positive COVID-19 results discussed with patient in detail.  Patient declined cough medication at this time. Detailed education provided about COVID-19 precautions and recommendations as per CDC guidelines.  Red flags discussed with patient in depth.  I strongly recommended patient to notify her primary/oncologist/immunologist regarding today's visit.  Patient enrolled in home monitoring program.  Discussed results/diagnosis/plan in depth with patient in clinic. Strict precautions given to patient to monitor for worsening signs and symptoms. Advised to follow up with primary.All questions answered. Strict ER precautions given. If your symptoms worsens or fail to improve you should go to the Emergency Room. Discharge and follow-up instructions given verbally/printed. Discharge and follow-up instructions discussed with the patient who expressed understanding and willingness to comply with my recommendations.Patient voiced understanding and in agreement with current treatment plan.     Please be advised this text was dictated with [employer] software and may contain errors due to translation.      Assessment:       1. COVID-19 virus  infection    2. Exposure to COVID-19 virus        Plan:         COVID-19 virus infection  -     POCT COVID-19 Rapid Screening  -     COVID-19 Surveillance Program  -     pulse oximeter (PULSE OXIMETER) device; by Apply Externally route 2 (two) times a day. Use twice daily at 8 AM and 3 PM and record the value in [employer] as directed.  Dispense: 1 each; Refill: 0    Exposure to COVID-19 virus      Patient Instructions   CDC Testing and Quarantine Guidelines for patients with exposure to a known-positive COVID-19 person:      A close exposure is defined as anyone who has had an exposure (masked or unmasked) to a known COVID -19 positive person within 6 ft for longer than 15 minutes. If your exposure meets this definition you are required by CDC guidelines to quarantine for at least 7-10 days from time of exposure. The CDC states that a test can be performed for an asymptomatic patient (someone who does not have any symptoms) after a close exposure, and that a test should be done if you develop symptoms after a close exposure as described above.  Specifically, you can test at day 5 or later if asymptomatic, in order to get released from quarantine on day 7 or later.  If you develop symptoms sooner, you should test when your symptoms start.    If you meet the definition of a close exposure, it will not matter whether you are experiencing symptoms- a quarantine for at least 7-10 days after a close exposure is required by CDC guidelines.  Please note, if you decide to test as an asymptomatic during your quarantine and you are positive, you will be restarting your quarantine and moving from a possible 10 day quarantine (if you do not test), to a 11 day or greater quarantine.  The CDC also suggests people still monitor for symptoms for a full 14 days and remember that the shorter quarantine options do not replace initial CDC guidance.  The CDC continues to recommend quarantining for 14 days as the best way to reduce risk  "for spreading COVID-19 - however, this is only a recommendation.  If your exposure does not meet the above definition, you can return to your normal daily activities to include social distancing, wearing a mask and frequent handwashing.       NEGATIVE COVID TEST  You have tested negative for COVID-19 today.  If you did not have a close exposure (as defined below) you can return to your normal daily activities to include social distancing, wearing a mask and frequent handwashing.  A "close exposure" is defined as anyone who has had an exposure (masked or unmasked) to a known COVID -19 positive person within 6 ft for longer than 15 minutes. If your exposure meets this definition, you are required by CDC guidelines to quarantine for at least 7-10 days from time of exposure.  The CDC states that a test can be performed for an asymptomatic patient (someone who does not have any symptoms) after a close exposure, and that a test should be done if you develop symptoms after a close exposure as described above.  Specifically, you can test at day 5 or later if asymptomatic in order to get released from quarantine on day 7 or later.  If you develop symptoms sooner, you should test when your symptoms start.  If you developed symptoms since the exposure, and your test was negative today and less than 5 days from your exposure, you still have to quarantine for 7-10 days from the date of the exposure.  The 7-10 day quarantine begins from the day you were exposed, not the day of your test.  For example, if your exposure was on a Monday, and you waited until Friday of the same week to get tested and it was negative, your 7-10 day quarantine begins from that Monday, not the Friday you tested negative.  Please note, if you decide to test as an asymptomatic during your quarantine and you are positive, you will be restarting your quarantine and moving from a possible 10 day quarantine (if you do not test), to a 11 day or greater " quarantine.    POSITIVE COVID TEST    You have tested positive for COVID-19 today.  Please note that patients who test positive for COVID-19 are required by the CDC to undergo isolation for 10 days after their symptoms first began.  This isolation starts from the day you first developed symptoms, not the day of your positive test. For example, if your symptoms began on a Monday but tested positive on the following Wednesday, your 10-day isolation begins from that Monday, not the Wednesday you tested positive.  However, if you are asymptomatic (a person who does not have any symptoms) and COVID-19 positive, your 10-day isolation begins on the day you tested positive, regardless of exposure date.  Also, per the CDC guidelines, once your 10 days have passed, and you have not had fever greater than 100.4F in the last 24 hours without taking any fever reducers such as Tylenol (Acetaminophen) or Motrin (Ibuprofen), you may return to your normal activities including social distancing, wearing masks, and frequent handwashing - YOU DO NOT NEED ANOTHER TEST IN ORDER TO END YOUR QUARANTINE.     If your condition worsens or fails to improve we recommend that you receive another evaluation at the ER immediately or contact your PCP to discuss your concerns or return here. You must understand that you've received an urgent care treatment only and that you may be released before all your medical problems are known or treated. You the patient will arrange for followup care as instructed.    If you were prescribed antibiotics, please take them to completion.  If you were prescribed a narcotic medication, do not drive or operate heavy equipment or machinery while taking these medications.  Please follow up with your primary care doctor or specialist as needed.  If you  smoke, please stop smoking.  Instructions for Patients with Confirmed or Suspected COVID-19    If you are awaiting your test result, you will either be called or it  will be released to the patient portal.  If you have any questions about your test, please visit [email or web address] or call our COVID-19 information line at [phone].      Instructions for non-hospitalized or discharged patients with confirmed or suspected COVID-19:       Stay home except to get medical care.    Separate yourself from other people and animals in your home.    Call ahead before visiting your doctor.    Wear a face mask.    Cover your coughs and sneezes.    Clean your hands often.    Avoid sharing personal household items.    Clean all high-touch surfaces every day.    Monitor your symptoms. Seek prompt medical attention if your illness is worsening (e.g., difficulty breathing). Before seeking care, call your healthcare provider.    If you have a medical emergency and must call 911, notify the dispatcher that you have or are being evaluated for COVID-19. If possible, put on a face mask before emergency medical services arrive.    Use the following symptom-based strategy to return to normal activity following a suspected or confirmed case of COVID-19. Continue isolation until:   o At least 3 days (72 hours) have passed since recovery defined as resolution of fever without the use of fever-reducing medications and improvement in respiratory symptoms (e.g. cough, shortness of breath), and   o At least 10 days have passed since the first positive test.       As one of the next steps, you will receive a call or text from the [place] Department of Health ([facility]) COVID-19 Tracing Team. See the contact information below so you know not to ignore the health departments call. It is important that you contact them back immediately so they can help.     Contact Tracer Number:  [phone]  Caller ID for most carriers: [facility]    What is contact tracing?   Contact tracing is a process that helps identify everyone who has been in close contact with an infected person. Contact  tracers let those people know they may have been exposed and guide them on next steps. Confidentiality is important for everyone; no one will be told who may have exposed them to the virus.   Your involvement is important. The more we know about where and how this virus is spreading, the better chance we have at stopping it from spreading further.  What does exposure mean?   Exposure means you have been within 6 feet for more than 15 minutes with a person who has or had COVID-19.  What kind of questions do the contact tracers ask?   A contact tracer will confirm your basic contact information including name, address, phone number, and next of kin, as well as asking about any symptoms you may have had. Theyll also ask you how you think you may have gotten sick, such as places where you may have been exposed to the virus, and people you were with. Those names will never be shared with anyone outside of that call, and will only be used to help trace and stop the spread of the virus.   I have privacy concerns. How will the state use my information?   Your privacy about your health is important. All calls are completed using call centers that use the appropriate health privacy protection measures (HIPAA compliance), meaning that your patient information is safe. No one will ever ask you any questions related to immigration status. Your health comes first.   Do I have to participate?   You do not have to participate, but we strongly encourage you to. Contact tracing can help us catch and control new outbreaks as theyre developing to keep your friends and family safe.   What if I dont hear from anyone?   If you dont receive a call within 24 hours, you can call the number above right away to inquire about your status. That line is open from 8:00 am - 8:00 p.m., 7 days a week.  Contact tracing saves lives! Together, we have the power to beat this virus and keep our loved ones and neighbors safe.       Instructions  for household members, intimate partners and caregivers in a non-healthcare setting of a patient with confirmed or suspected COVID-19:         Close contacts should monitor their health and call their healthcare provider right away if they develop symptoms suggestive of COVID-19 (e.g., fever, cough, shortness of breath).    Stay home except to get medical care. Separate yourself from other people and animals in the home.   Monitor the patients symptoms. If the patient is getting sicker, call his or her healthcare provider. If the patient has a medical emergency and you need to call 911, notify the dispatch personnel that the patient has or is being evaluated for COVID-19.    Wear a facemask when around other people such as sharing a room or vehicle and before entering a healthcare provider's office.   Cover coughs and sneezes with a tissue. Throw used tissues in a lined trash can immediately and wash hands.   Clean hands often with soap and water for at least 20 seconds or with an alcohol-based hand , rubbing hands together until they feel dry. Avoid touching your eyes, nose, and mouth with unwashed hands.   Clean all high-touch; surfaces every day, including counters, tabletops, doorknobs, bathroom fixtures, toilets, phones, keyboards, tablets, bedside tables, etc. Use a household cleaning spray or wipe according to label instructions.   Avoid sharing personal household items such as dishes, drinking glasses, cups, towels, bedding, etc. After these items are used, they should be washed thoroughly with soap and water.   Continue isolation until:   At least 3 days (72 hours) have passed since recovery defined as resolution of fever without the use of fever-reducing medications and improvement in respiratory symptoms (e.g. cough, shortness of breath), and    At least 10 days have passed since the patients first positive  test.    https://www.cdc.gov/coronavirus/2019-ncov/your-health/index.htm

## 2020-12-13 NOTE — PATIENT INSTRUCTIONS
CDC Testing and Quarantine Guidelines for patients with exposure to a known-positive COVID-19 person:      A close exposure is defined as anyone who has had an exposure (masked or unmasked) to a known COVID -19 positive person within 6 ft for longer than 15 minutes. If your exposure meets this definition you are required by CDC guidelines to quarantine for at least 7-10 days from time of exposure. The CDC states that a test can be performed for an asymptomatic patient (someone who does not have any symptoms) after a close exposure, and that a test should be done if you develop symptoms after a close exposure as described above.  Specifically, you can test at day 5 or later if asymptomatic, in order to get released from quarantine on day 7 or later.  If you develop symptoms sooner, you should test when your symptoms start.    If you meet the definition of a close exposure, it will not matter whether you are experiencing symptoms- a quarantine for at least 7-10 days after a close exposure is required by CDC guidelines.  Please note, if you decide to test as an asymptomatic during your quarantine and you are positive, you will be restarting your quarantine and moving from a possible 10 day quarantine (if you do not test), to a 11 day or greater quarantine.  The CDC also suggests people still monitor for symptoms for a full 14 days and remember that the shorter quarantine options do not replace initial CDC guidance.  The CDC continues to recommend quarantining for 14 days as the best way to reduce risk for spreading COVID-19 - however, this is only a recommendation.  If your exposure does not meet the above definition, you can return to your normal daily activities to include social distancing, wearing a mask and frequent handwashing.       NEGATIVE COVID TEST  You have tested negative for COVID-19 today.  If you did not have a close exposure (as defined below) you can return to your normal daily activities to include  "social distancing, wearing a mask and frequent handwashing.  A "close exposure" is defined as anyone who has had an exposure (masked or unmasked) to a known COVID -19 positive person within 6 ft for longer than 15 minutes. If your exposure meets this definition, you are required by CDC guidelines to quarantine for at least 7-10 days from time of exposure.  The CDC states that a test can be performed for an asymptomatic patient (someone who does not have any symptoms) after a close exposure, and that a test should be done if you develop symptoms after a close exposure as described above.  Specifically, you can test at day 5 or later if asymptomatic in order to get released from quarantine on day 7 or later.  If you develop symptoms sooner, you should test when your symptoms start.  If you developed symptoms since the exposure, and your test was negative today and less than 5 days from your exposure, you still have to quarantine for 7-10 days from the date of the exposure.  The 7-10 day quarantine begins from the day you were exposed, not the day of your test.  For example, if your exposure was on a Monday, and you waited until Friday of the same week to get tested and it was negative, your 7-10 day quarantine begins from that Monday, not the Friday you tested negative.  Please note, if you decide to test as an asymptomatic during your quarantine and you are positive, you will be restarting your quarantine and moving from a possible 10 day quarantine (if you do not test), to a 11 day or greater quarantine.    POSITIVE COVID TEST    You have tested positive for COVID-19 today.  Please note that patients who test positive for COVID-19 are required by the CDC to undergo isolation for 10 days after their symptoms first began.  This isolation starts from the day you first developed symptoms, not the day of your positive test. For example, if your symptoms began on a Monday but tested positive on the following Wednesday, " your 10-day isolation begins from that Monday, not the Wednesday you tested positive.  However, if you are asymptomatic (a person who does not have any symptoms) and COVID-19 positive, your 10-day isolation begins on the day you tested positive, regardless of exposure date.  Also, per the CDC guidelines, once your 10 days have passed, and you have not had fever greater than 100.4F in the last 24 hours without taking any fever reducers such as Tylenol (Acetaminophen) or Motrin (Ibuprofen), you may return to your normal activities including social distancing, wearing masks, and frequent handwashing - YOU DO NOT NEED ANOTHER TEST IN ORDER TO END YOUR QUARANTINE.     If your condition worsens or fails to improve we recommend that you receive another evaluation at the ER immediately or contact your PCP to discuss your concerns or return here. You must understand that you've received an urgent care treatment only and that you may be released before all your medical problems are known or treated. You the patient will arrange for followup care as instructed.    If you were prescribed antibiotics, please take them to completion.  If you were prescribed a narcotic medication, do not drive or operate heavy equipment or machinery while taking these medications.  Please follow up with your primary care doctor or specialist as needed.  If you  smoke, please stop smoking.  Instructions for Patients with Confirmed or Suspected COVID-19    If you are awaiting your test result, you will either be called or it will be released to the patient portal.  If you have any questions about your test, please visit [email or web address] or call our COVID-19 information line at [phone].      Instructions for non-hospitalized or discharged patients with confirmed or suspected COVID-19:       Stay home except to get medical care.    Separate yourself from other people and animals in your home.    Call ahead before visiting your  doctor.    Wear a face mask.    Cover your coughs and sneezes.    Clean your hands often.    Avoid sharing personal household items.    Clean all high-touch surfaces every day.    Monitor your symptoms. Seek prompt medical attention if your illness is worsening (e.g., difficulty breathing). Before seeking care, call your healthcare provider.    If you have a medical emergency and must call 911, notify the dispatcher that you have or are being evaluated for COVID-19. If possible, put on a face mask before emergency medical services arrive.    Use the following symptom-based strategy to return to normal activity following a suspected or confirmed case of COVID-19. Continue isolation until:   o At least 3 days (72 hours) have passed since recovery defined as resolution of fever without the use of fever-reducing medications and improvement in respiratory symptoms (e.g. cough, shortness of breath), and   o At least 10 days have passed since the first positive test.       As one of the next steps, you will receive a call or text from the [place] Department of Health ([facility]) COVID-19 Tracing Team. See the contact information below so you know not to ignore the health departments call. It is important that you contact them back immediately so they can help.     Contact Tracer Number:  [phone]  Caller ID for most carriers: [facility]    What is contact tracing?   Contact tracing is a process that helps identify everyone who has been in close contact with an infected person. Contact tracers let those people know they may have been exposed and guide them on next steps. Confidentiality is important for everyone; no one will be told who may have exposed them to the virus.   Your involvement is important. The more we know about where and how this virus is spreading, the better chance we have at stopping it from spreading further.  What does exposure mean?   Exposure means you have been within 6 feet for  more than 15 minutes with a person who has or had COVID-19.  What kind of questions do the contact tracers ask?   A contact tracer will confirm your basic contact information including name, address, phone number, and next of kin, as well as asking about any symptoms you may have had. Theyll also ask you how you think you may have gotten sick, such as places where you may have been exposed to the virus, and people you were with. Those names will never be shared with anyone outside of that call, and will only be used to help trace and stop the spread of the virus.   I have privacy concerns. How will the state use my information?   Your privacy about your health is important. All calls are completed using call centers that use the appropriate health privacy protection measures (HIPAA compliance), meaning that your patient information is safe. No one will ever ask you any questions related to immigration status. Your health comes first.   Do I have to participate?   You do not have to participate, but we strongly encourage you to. Contact tracing can help us catch and control new outbreaks as theyre developing to keep your friends and family safe.   What if I dont hear from anyone?   If you dont receive a call within 24 hours, you can call the number above right away to inquire about your status. That line is open from 8:00 am - 8:00 p.m., 7 days a week.  Contact tracing saves lives! Together, we have the power to beat this virus and keep our loved ones and neighbors safe.       Instructions for household members, intimate partners and caregivers in a non-healthcare setting of a patient with confirmed or suspected COVID-19:         Close contacts should monitor their health and call their healthcare provider right away if they develop symptoms suggestive of COVID-19 (e.g., fever, cough, shortness of breath).    Stay home except to get medical care. Separate yourself from other people and animals in the  home.   Monitor the patients symptoms. If the patient is getting sicker, call his or her healthcare provider. If the patient has a medical emergency and you need to call 911, notify the dispatch personnel that the patient has or is being evaluated for COVID-19.    Wear a facemask when around other people such as sharing a room or vehicle and before entering a healthcare provider's office.   Cover coughs and sneezes with a tissue. Throw used tissues in a lined trash can immediately and wash hands.   Clean hands often with soap and water for at least 20 seconds or with an alcohol-based hand , rubbing hands together until they feel dry. Avoid touching your eyes, nose, and mouth with unwashed hands.   Clean all high-touch; surfaces every day, including counters, tabletops, doorknobs, bathroom fixtures, toilets, phones, keyboards, tablets, bedside tables, etc. Use a household cleaning spray or wipe according to label instructions.   Avoid sharing personal household items such as dishes, drinking glasses, cups, towels, bedding, etc. After these items are used, they should be washed thoroughly with soap and water.   Continue isolation until:   At least 3 days (72 hours) have passed since recovery defined as resolution of fever without the use of fever-reducing medications and improvement in respiratory symptoms (e.g. cough, shortness of breath), and    At least 10 days have passed since the patients first positive test.    https://www.cdc.gov/coronavirus/2019-ncov/your-health/index.htm

## 2020-12-13 NOTE — TELEPHONE ENCOUNTER
Pt called said that she has nasal congestion , denies SOB, cp no fever just chills. Will pick pulse ox tomorrow.    Reason for Disposition   [1] COVID-19 diagnosed by HCP (doctor, NP or PA) AND [2] mild symptoms (e.g., cough, fever, others) AND [3] no complications or SOB    Additional Information   Negative: SEVERE difficulty breathing (e.g., struggling for each breath, speaks in single words)   Negative: Difficult to awaken or acting confused (e.g., disoriented, slurred speech)   Negative: Bluish (or gray) lips or face now   Negative: Shock suspected (e.g., cold/pale/clammy skin, too weak to stand, low BP, rapid pulse)   Negative: Sounds like a life-threatening emergency to the triager   Negative: [1] COVID-19 exposure AND [2] no symptoms   Negative: [1] Lives with someone known to have influenza (flu test positive) AND [2] flu-like symptoms (e.g., cough, runny nose, sore throat, SOB; with or without fever)   Negative: [1] Adult with possible COVID-19 symptoms AND [2] triager concerned about severity of symptoms or other causes   Negative: Immunization reaction suspected (e.g., fever, headache, muscle aches occurring during days 1-3 days after immunization)   Negative: COVID-19 and breastfeeding, questions about   Negative: SEVERE or constant chest pain or pressure (Exception: mild central chest pain, present only when coughing)   Negative: MODERATE difficulty breathing (e.g., speaks in phrases, SOB even at rest, pulse 100-120)   Negative: [1] Headache AND [2] stiff neck (can't touch chin to chest)   Negative: MILD difficulty breathing (e.g., minimal/no SOB at rest, SOB with walking, pulse <100)   Negative: Chest pain or pressure   Negative: Patient sounds very sick or weak to the triager   Negative: Fever > 103 F (39.4 C)   Negative: [1] Fever > 101 F (38.3 C) AND [2] age > 60   Negative: [1] Fever > 100.0 F (37.8 C) AND [2] bedridden (e.g., nursing home patient, CVA, chronic illness,  recovering from surgery)   Negative: [1] HIGH RISK patient (e.g., age > 64 years, diabetes, heart or lung disease, weak immune system) AND [2] new or worsening symptoms   Negative: [1] HIGH RISK patient AND [2] influenza is widespread in the community AND [3] ONE OR MORE respiratory symptoms: cough, sore throat, runny or stuffy nose   Negative: [1] HIGH RISK patient AND [2] influenza exposure within the last 7 days AND [3] ONE OR MORE respiratory symptoms: cough, sore throat, runny or stuffy nose   Negative: Fever present > 3 days (72 hours)   Negative: [1] Fever returns after gone for over 24 hours AND [2] symptoms worse or not improved   Negative: [1] Continuous (nonstop) coughing interferes with work or school AND [2] no improvement using cough treatment per protocol   Negative: [1] COVID-19 infection suspected by caller or triager AND [2] mild symptoms (cough, fever, or others) AND [3] no complications or SOB   Negative: Cough present > 3 weeks   Negative: [1] COVID-19 diagnosed by positive lab test AND [2] mild symptoms (e.g., cough, fever, others) AND [3] no complications or SOB    Protocols used: CORONAVIRUS (COVID-19) DIAGNOSED OR [id number]

## 2020-12-14 ENCOUNTER — PATIENT MESSAGE (OUTPATIENT)
Dept: ADMINISTRATIVE | Facility: OTHER | Age: 35
End: 2020-12-14

## 2020-12-14 ENCOUNTER — NURSE TRIAGE (OUTPATIENT)
Dept: ADMINISTRATIVE | Facility: CLINIC | Age: 35
End: 2020-12-14

## 2020-12-14 NOTE — TELEPHONE ENCOUNTER
Patient initially escalated due to no response, however patient entered vitals prior to phone call. Vital signs and symptoms did not trigger a second escalation; therefore, no follow up call is needed at this time.  Patient just received pulse oximeter and entered VS. O2 sat 99%, P 78, t-97.6. Denies fever and SOB. Has virtual visit with PCP tomorrow.     Reason for Disposition   Information only question and nurse able to answer    Additional Information   Negative: Nursing judgment   Negative: Nursing judgment   Negative: Nursing judgment   Negative: Nursing judgment    Protocols used: INFORMATION ONLY CALL - NO TRIAGE-A-[place]

## 2020-12-15 ENCOUNTER — PATIENT MESSAGE (OUTPATIENT)
Dept: ADMINISTRATIVE | Facility: OTHER | Age: 35
End: 2020-12-15

## 2020-12-16 ENCOUNTER — PATIENT MESSAGE (OUTPATIENT)
Dept: ADMINISTRATIVE | Facility: OTHER | Age: 35
End: 2020-12-16

## 2020-12-17 ENCOUNTER — PATIENT MESSAGE (OUTPATIENT)
Dept: ADMINISTRATIVE | Facility: OTHER | Age: 35
End: 2020-12-17

## 2020-12-18 ENCOUNTER — PATIENT MESSAGE (OUTPATIENT)
Dept: ADMINISTRATIVE | Facility: OTHER | Age: 35
End: 2020-12-18

## 2020-12-18 ENCOUNTER — NURSE TRIAGE (OUTPATIENT)
Dept: ADMINISTRATIVE | Facility: CLINIC | Age: 35
End: 2020-12-18

## 2020-12-18 NOTE — TELEPHONE ENCOUNTER
Patient initially escalated due to no response, however patient entered vitals prior to phone call. Vital signs and symptoms did not trigger a second escalation; therefore, no follow up call is needed at this time.      Reason for Disposition   Caller has cancelled the call before the first contact    Protocols used: NO CONTACT OR DUPLICATE CONTACT CALL-A-[place]

## 2020-12-19 ENCOUNTER — NURSE TRIAGE (OUTPATIENT)
Dept: ADMINISTRATIVE | Facility: CLINIC | Age: 35
End: 2020-12-19

## 2020-12-19 ENCOUNTER — PATIENT MESSAGE (OUTPATIENT)
Dept: ADMINISTRATIVE | Facility: OTHER | Age: 35
End: 2020-12-19

## 2020-12-20 ENCOUNTER — NURSE TRIAGE (OUTPATIENT)
Dept: ADMINISTRATIVE | Facility: CLINIC | Age: 35
End: 2020-12-20

## 2020-12-20 ENCOUNTER — PATIENT MESSAGE (OUTPATIENT)
Dept: ADMINISTRATIVE | Facility: OTHER | Age: 35
End: 2020-12-20

## 2020-12-20 NOTE — TELEPHONE ENCOUNTER
"Call placed to [phone]: COVID-19 surveillance program patient; patient's identity verified with name and ;Patient initially escalated due to answering "yes" to having had a fever, however patient states she had been taking "cold and flu medication with tylenol in it around the clock" and skipped yesterday and her temp spiked to 100; patient denies a fever at this time; CV-19 home care protocol advice given; did not trigger a second escalation; therefore, no further triage is needed at this time; Advised to call back if further assistance is needed; with chest pains, 93% SpO2 or lower, or difficulty breathing to call 911 or go to the nearest ED, wear a mask and call ahead to alert them of COVID-19 status Patient verbalizes understanding and agrees to follow care advice given.    Sp02: 99  Pulse: 90  Temperature: 98.5  SOB at rest (0-5): 0  SOB with activity (0-5): 0  Worsening symptoms: no  Fever symptoms: yes      Reason for Disposition   Health Information question, no triage required and triager able to answer question    Protocols used: INFORMATION ONLY CALL - NO TRIAGE-A-      "

## 2020-12-20 NOTE — TELEPHONE ENCOUNTER
Patient initially escalated due to no response, however patient entered vitals prior to phone call. Vital signs and symptoms did not trigger a second escalation; therefore, no follow up call is needed at this time.      Reason for Disposition   Caller has cancelled the call before the first contact    Additional Information   Negative: Caller has already spoken with the PCP (or office), and has no further questions   Negative: Caller has already spoken with another triager and has no further questions   Negative: Caller has already spoken with another triager or PCP (or office), and has further questions and triager able to answer questions.   Negative: Busy signal.  First attempt to contact caller.  Follow-up call scheduled within 15 minutes.   Negative: No answer.  First attempt to contact caller.  Follow-up call scheduled within 15 minutes.   Negative: Message left on identified voicemail   Negative: Message left on unidentified voice mail. Phone number verified.   Negative: Message left with person in household   Negative: Wrong number reached. Phone number verified.   Negative: Second attempt to contact family AND no contact made. Phone number verified.   Negative: Cell phone out of range. Phone number verified.   Negative: Patient already left for the hospital/clinic   Negative: Unable to complete triage due to phone connection issues    Protocols used: NO CONTACT OR DUPLICATE CONTACT CALL-A-[place]

## 2020-12-21 ENCOUNTER — PATIENT MESSAGE (OUTPATIENT)
Dept: ADMINISTRATIVE | Facility: OTHER | Age: 35
End: 2020-12-21

## 2020-12-21 ENCOUNTER — NURSE TRIAGE (OUTPATIENT)
Dept: ADMINISTRATIVE | Facility: CLINIC | Age: 35
End: 2020-12-21

## 2020-12-22 ENCOUNTER — NURSE TRIAGE (OUTPATIENT)
Dept: ADMINISTRATIVE | Facility: CLINIC | Age: 35
End: 2020-12-22

## 2020-12-22 ENCOUNTER — PATIENT MESSAGE (OUTPATIENT)
Dept: ADMINISTRATIVE | Facility: OTHER | Age: 35
End: 2020-12-22

## 2020-12-22 NOTE — TELEPHONE ENCOUNTER
1242 Patient initially escalated due to no response, however patient did not answer reminder call; 1st attempt to call [phone]: no answer; no contact; verified phone number; LM on  to call for further assistance [phone] or call 911 or go to nearest ER in an emergency situation.    1246 2nd attempt: call placed to [phone]; no answer; no contact; phone number verified. [employer] reminder sent.    Reason for Disposition   Message left on unidentified voice mail. Phone number verified.    Protocols used: NO CONTACT OR DUPLICATE CONTACT CALL-A-[place]

## 2020-12-23 ENCOUNTER — PATIENT MESSAGE (OUTPATIENT)
Dept: ADMINISTRATIVE | Facility: OTHER | Age: 35
End: 2020-12-23

## 2020-12-23 ENCOUNTER — NURSE TRIAGE (OUTPATIENT)
Dept: ADMINISTRATIVE | Facility: CLINIC | Age: 35
End: 2020-12-23

## 2020-12-23 ENCOUNTER — PATIENT MESSAGE (OUTPATIENT)
Dept: ADMINISTRATIVE | Facility: CLINIC | Age: 35
End: 2020-12-23

## 2020-12-24 ENCOUNTER — NURSE TRIAGE (OUTPATIENT)
Dept: ADMINISTRATIVE | Facility: CLINIC | Age: 35
End: 2020-12-24

## 2020-12-24 NOTE — TELEPHONE ENCOUNTER
"Pt contacted for no response and pt request too opt out of program states "I'm feeling much better and I have completed my home isolation."  All task removed and appropriate messages routed.    Reason for Disposition   [1] Follow-up call to recent contact AND [2] information only call, no triage required    Protocols used: INFORMATION ONLY CALL - NO TRIAGE-A-      "

## 2021-03-23 ENCOUNTER — CLINICAL SUPPORT (OUTPATIENT)
Dept: URGENT CARE | Facility: CLINIC | Age: 36
End: 2021-03-23
Payer: MEDICAID

## 2021-03-23 DIAGNOSIS — Z20.822 ENCOUNTER FOR LABORATORY TESTING FOR COVID-19 VIRUS: Primary | ICD-10-CM

## 2021-03-23 LAB
CTP QC/QA: YES
SARS-COV-2 RDRP RESP QL NAA+PROBE: NEGATIVE

## 2021-03-23 PROCEDURE — U0002: ICD-10-PCS | Mod: QW,S$GLB,, | Performed by: FAMILY MEDICINE

## 2021-03-23 PROCEDURE — U0002 COVID-19 LAB TEST NON-CDC: HCPCS | Mod: QW,S$GLB,, | Performed by: FAMILY MEDICINE

## 2022-02-16 ENCOUNTER — PATIENT MESSAGE (OUTPATIENT)
Dept: RESEARCH | Facility: HOSPITAL | Age: 37
End: 2022-02-16
Payer: MEDICAID

## 2022-09-08 ENCOUNTER — OFFICE VISIT (OUTPATIENT)
Dept: URGENT CARE | Facility: CLINIC | Age: 37
End: 2022-09-08
Payer: MEDICAID

## 2022-09-08 VITALS
HEIGHT: 62 IN | BODY MASS INDEX: 43.24 KG/M2 | WEIGHT: 235 LBS | SYSTOLIC BLOOD PRESSURE: 116 MMHG | RESPIRATION RATE: 17 BRPM | TEMPERATURE: 98 F | HEART RATE: 71 BPM | OXYGEN SATURATION: 99 % | DIASTOLIC BLOOD PRESSURE: 78 MMHG

## 2022-09-08 DIAGNOSIS — R10.9 ABDOMINAL PAIN, UNSPECIFIED ABDOMINAL LOCATION: Primary | ICD-10-CM

## 2022-09-08 LAB
BILIRUB UR QL STRIP: NEGATIVE
GLUCOSE UR QL STRIP: NEGATIVE
KETONES UR QL STRIP: NEGATIVE
LEUKOCYTE ESTERASE UR QL STRIP: NEGATIVE
PH, POC UA: 6 (ref 5–8)
POC BLOOD, URINE: NEGATIVE
POC NITRATES, URINE: NEGATIVE
PROT UR QL STRIP: POSITIVE
SP GR UR STRIP: 1.01 (ref 1–1.03)
UROBILINOGEN UR STRIP-ACNC: ABNORMAL (ref 0.1–1.1)

## 2022-09-08 PROCEDURE — 1159F MED LIST DOCD IN RCRD: CPT | Mod: CPTII,S$GLB,, | Performed by: NURSE PRACTITIONER

## 2022-09-08 PROCEDURE — 1159F PR MEDICATION LIST DOCUMENTED IN MEDICAL RECORD: ICD-10-PCS | Mod: CPTII,S$GLB,, | Performed by: NURSE PRACTITIONER

## 2022-09-08 PROCEDURE — 3074F PR MOST RECENT SYSTOLIC BLOOD PRESSURE < 130 MM HG: ICD-10-PCS | Mod: CPTII,S$GLB,, | Performed by: NURSE PRACTITIONER

## 2022-09-08 PROCEDURE — 3008F PR BODY MASS INDEX (BMI) DOCUMENTED: ICD-10-PCS | Mod: CPTII,S$GLB,, | Performed by: NURSE PRACTITIONER

## 2022-09-08 PROCEDURE — 99213 PR OFFICE/OUTPT VISIT, EST, LEVL III, 20-29 MIN: ICD-10-PCS | Mod: S$GLB,,, | Performed by: NURSE PRACTITIONER

## 2022-09-08 PROCEDURE — 3074F SYST BP LT 130 MM HG: CPT | Mod: CPTII,S$GLB,, | Performed by: NURSE PRACTITIONER

## 2022-09-08 PROCEDURE — 1160F RVW MEDS BY RX/DR IN RCRD: CPT | Mod: CPTII,S$GLB,, | Performed by: NURSE PRACTITIONER

## 2022-09-08 PROCEDURE — 1160F PR REVIEW ALL MEDS BY PRESCRIBER/CLIN PHARMACIST DOCUMENTED: ICD-10-PCS | Mod: CPTII,S$GLB,, | Performed by: NURSE PRACTITIONER

## 2022-09-08 PROCEDURE — 3078F DIAST BP <80 MM HG: CPT | Mod: CPTII,S$GLB,, | Performed by: NURSE PRACTITIONER

## 2022-09-08 PROCEDURE — 81003 POCT URINALYSIS, DIPSTICK, AUTOMATED, W/O SCOPE: ICD-10-PCS | Mod: QW,S$GLB,, | Performed by: NURSE PRACTITIONER

## 2022-09-08 PROCEDURE — 87086 URINE CULTURE/COLONY COUNT: CPT | Performed by: NURSE PRACTITIONER

## 2022-09-08 PROCEDURE — 99213 OFFICE O/P EST LOW 20 MIN: CPT | Mod: S$GLB,,, | Performed by: NURSE PRACTITIONER

## 2022-09-08 PROCEDURE — 81003 URINALYSIS AUTO W/O SCOPE: CPT | Mod: QW,S$GLB,, | Performed by: NURSE PRACTITIONER

## 2022-09-08 PROCEDURE — 3008F BODY MASS INDEX DOCD: CPT | Mod: CPTII,S$GLB,, | Performed by: NURSE PRACTITIONER

## 2022-09-08 PROCEDURE — 3078F PR MOST RECENT DIASTOLIC BLOOD PRESSURE < 80 MM HG: ICD-10-PCS | Mod: CPTII,S$GLB,, | Performed by: NURSE PRACTITIONER

## 2022-09-08 RX ORDER — SEMAGLUTIDE 1 MG/.5ML
1 INJECTION, SOLUTION SUBCUTANEOUS
COMMUNITY
Start: 2022-06-04

## 2022-09-08 NOTE — PROGRESS NOTES
"Subjective:       Patient ID: Monique Rai is a 37 y.o. female.    Vitals:  height is 5' 2" (1.575 m) and weight is 106.6 kg (235 lb). Her temperature is 98.2 °F (36.8 °C). Her blood pressure is 116/78 and her pulse is 71. Her respiration is 17 and oxygen saturation is 99%.     Chief Complaint: Abdominal Pain    38yo female pt c/o pain to RLQ and RUQ.  Reports pain has been recurrent since January (8 months ago), at onset pain was sharp and occurred randomly, self-resolving after a few hours.  Reports that symptoms have continued ever since, reports pain is now more dull and occurs more frequently, changing in intensity over past 2-3 days (worse and better randomly).  Reports that she had appt with PCP yesterday to discuss, but unable to go to appt due to PCP getting COVID.  Reports HX of abdominal surgery in past for weight loss, reports that she still has both appendix and gallbladder.  Reports associated intermittent nausea and states that she feels like she's had decreased oral intake recently.  Denies fever/chills, denies n/v/d, denies constipation.    Abdominal Pain  Episode onset: 01/2022. The onset quality is undetermined. The problem occurs intermittently. The pain is located in the RLQ. The quality of the pain is aching. The abdominal pain does not radiate. Associated symptoms include nausea. Pertinent negatives include no constipation, diarrhea, fever or vomiting. The pain is aggravated by palpation and deep breathing. The pain is relieved by Nothing. She has tried acetaminophen for the symptoms. Prior diagnostic workup includes upper endoscopy. Her past medical history is significant for abdominal surgery.     Constitution: Negative for chills and fever.   Gastrointestinal:  Positive for abdominal pain, history of abdominal surgery and nausea. Negative for abdominal trauma, vomiting, constipation and diarrhea.     Objective:      Physical Exam   Constitutional: She is oriented to person, place, " and time. She appears well-developed.   HENT:   Head: Normocephalic and atraumatic.   Ears:   Right Ear: External ear normal.   Left Ear: External ear normal.   Nose: Nose normal.   Mouth/Throat: Mucous membranes are normal.   Eyes: Conjunctivae and lids are normal.   Neck: Trachea normal. Neck supple.   Cardiovascular: Normal rate, regular rhythm and normal heart sounds.   Pulmonary/Chest: Effort normal and breath sounds normal. No respiratory distress.   Abdominal: Normal appearance and bowel sounds are normal. She exhibits no distension, no pulsatile midline mass and no mass. Soft. There is no splenomegaly or hepatomegaly. There is abdominal tenderness in the right upper quadrant. There is no rebound, no guarding, no tenderness at McBurney's point, negative Portillo's sign, no left CVA tenderness, negative Rovsing's sign, negative psoas sign, no right CVA tenderness and negative obturator sign.     Musculoskeletal: Normal range of motion.         General: Normal range of motion.   Neurological: She is alert and oriented to person, place, and time. She has normal strength.   Skin: Skin is warm, dry, intact, not diaphoretic and not pale.   Psychiatric: Her speech is normal and behavior is normal. Judgment and thought content normal.   Nursing note and vitals reviewed.    Results for orders placed or performed in visit on 09/08/22   POCT Urinalysis, Dipstick, Automated, W/O Scope   Result Value Ref Range    POC Blood, Urine Negative Negative    POC Bilirubin, Urine Negative Negative    POC Urobilinogen, Urine norm 0.1 - 1.1    POC Ketones, Urine Negative Negative    POC Protein, Urine Positive (A) Negative    POC Nitrates, Urine Negative Negative    POC Glucose, Urine Negative Negative    pH, UA 6.0 5 - 8    POC Specific Gravity, Urine 1.015 1.003 - 1.029    POC Leukocytes, Urine Negative Negative           Assessment:       1. Abdominal pain, unspecified abdominal location            Plan:       Obtained appt for  abdominal ultrasound, provided instructions on location and fasting before appt, recommended follow-up with PCP.  Sent urine for culture, will call with results.  Provided extensive education on return/ER precautions, urged immediate ER visit if pain worsens or localizes further to RUQ or RLQ, or if fever and vomiting develop, pt verbalized understanding.  Pt agreed to treatment plan.    Abdominal pain, unspecified abdominal location  -     POCT Urinalysis, Dipstick, Automated, W/O Scope  -     US Abdomen Limited; Future; Expected date: 09/08/2022  -     Urine culture       Patient Instructions   If your condition worsens or fails to improve, we recommend that you receive another evaluation at the ER immediately, contact your PCP to discuss your concerns, or return here.  You must understand that you've received an urgent care treatment only, and that you may be released before all your medical problems are known or treated.  You, the patient, will arrange for follow-up care as instructed.     Watch for any increase in pain, fever, localized pain to right lower abdomen, or continued vomiting or diarrhea.     If you have diarrhea, you can use Imodium OTC short-term for symptom relief.  Adoption of a BRAT diet (bland, rice, applesauce, and toast) may help to reduce pain and cramping.

## 2022-09-08 NOTE — PATIENT INSTRUCTIONS
If your condition worsens or fails to improve, we recommend that you receive another evaluation at the ER immediately, contact your PCP to discuss your concerns, or return here.  You must understand that you've received an urgent care treatment only, and that you may be released before all your medical problems are known or treated.  You, the patient, will arrange for follow-up care as instructed.     Watch for any increase in pain, fever, localized pain to right lower abdomen, or continued vomiting or diarrhea.     If you have diarrhea, you can use Imodium OTC short-term for symptom relief.  Adoption of a BRAT diet (bland, rice, applesauce, and toast) may help to reduce pain and cramping.

## 2022-09-09 ENCOUNTER — HOSPITAL ENCOUNTER (OUTPATIENT)
Dept: RADIOLOGY | Facility: HOSPITAL | Age: 37
Discharge: HOME | End: 2022-09-09
Attending: NURSE PRACTITIONER
Payer: MEDICAID

## 2022-09-09 DIAGNOSIS — R10.9 ABDOMINAL PAIN, UNSPECIFIED ABDOMINAL LOCATION: ICD-10-CM

## 2022-09-09 PROCEDURE — 76705 ECHO EXAM OF ABDOMEN: CPT | Mod: TC

## 2022-09-09 PROCEDURE — 76705 US ABDOMEN LIMITED: ICD-10-PCS | Mod: 26,,, | Performed by: RADIOLOGY

## 2022-09-09 PROCEDURE — 76705 ECHO EXAM OF ABDOMEN: CPT | Mod: 26,,, | Performed by: RADIOLOGY

## 2022-09-10 LAB — BACTERIA UR CULT: NO GROWTH

## 2022-09-11 ENCOUNTER — TELEPHONE (OUTPATIENT)
Dept: URGENT CARE | Facility: CLINIC | Age: 37
End: 2022-09-11
Payer: MEDICAID

## 2022-09-11 NOTE — TELEPHONE ENCOUNTER
Left message for patient to discuss recent ultrasound results. Ultrasound shows cholelithiasis without acute cholecystitis.

## 2024-11-29 ENCOUNTER — ON-DEMAND VIRTUAL (OUTPATIENT)
Dept: URGENT CARE | Facility: CLINIC | Age: 39
End: 2024-11-29
Payer: MEDICAID

## 2024-11-29 DIAGNOSIS — J01.91 ACUTE RECURRENT SINUSITIS, UNSPECIFIED LOCATION: Primary | ICD-10-CM

## 2024-11-29 RX ORDER — AZITHROMYCIN 250 MG/1
TABLET, FILM COATED ORAL
Qty: 6 TABLET | Refills: 0 | Status: SHIPPED | OUTPATIENT
Start: 2024-11-29 | End: 2024-12-04

## 2024-11-29 NOTE — PROGRESS NOTES
Subjective:      Patient ID: Monique Rai is a 39 y.o. female.    Vitals:  vitals were not taken for this visit.     Chief Complaint: Sinus Problem      Visit Type: TELE AUDIOVISUAL    Present with the patient at the time of consultation: TELEMED PRESENT WITH PATIENT: None    Past Medical History:   Diagnosis Date    Anemia     Cancer     thyroid    Hypoglycemia     Thyroid disease      Past Surgical History:   Procedure Laterality Date     SECTION      STOMACH SURGERY      THYROIDECTOMY       Review of patient's allergies indicates:  No Known Allergies  Current Outpatient Medications on File Prior to Visit   Medication Sig Dispense Refill    ARMOUR THYROID 60 mg Tab Take 1 tablet by mouth once daily.      ascorbic acid, vitamin C, (VITAMIN C) 1000 MG tablet Take 1,000 mg by mouth once daily.      cetirizine (ZYRTEC) 10 MG tablet Take 1 tablet (10 mg total) by mouth once daily. 30 tablet 0    ergocalciferol (ERGOCALCIFEROL) 50,000 unit Cap Take 50,000 Units by mouth.      ferrous sulfate 325 mg (65 mg iron) Tab tablet TK 1 T PO QD  3    hydrOXYzine HCL (ATARAX) 25 MG tablet Take 25 mg by mouth.      levocetirizine (XYZAL) 5 MG tablet TK 1 T PO QHS UNTIL DIRECTED TO STOP      levothyroxine (SYNTHROID) 200 MCG tablet TK 1 T PO D  5    ondansetron (ZOFRAN) 8 MG tablet Take 8 mg by mouth.      promethazine (PHENERGAN) 12.5 MG Tab Take 1 tablet (12.5 mg total) by mouth 4 (four) times daily. (Patient not taking: Reported on 2022) 30 tablet 1    pulse oximeter (PULSE OXIMETER) device by Apply Externally route 2 (two) times a day. Use twice daily at 8 AM and 3 PM and [facility]. (Patient not taking: Reported on 2022) 1 each 0    semaglutide, weight loss, (WEGOVY) 1 mg/0.5 mL PnIj Inject 1 mg into the skin.       No current facility-administered medications on file prior to visit.     Family History   Problem Relation Name Age of Onset    No Known Problems Mother      No Known  Problems Father         Medications Ordered                [id number] - [address] [facility] [address]   [address] [address]    Telephone: [phone]   Fax: [phone]   Hours: Not open 24 hours                         E-Prescribed (1 of 1)              azithromycin (Z-MARVA) 250 MG tablet    Sig: Take 2 tablets by mouth on day 1; Take 1 tablet by mouth on days 2-5       Start: 11/29/24     Quantity: 6 tablet Refills: 0                           Ohs Peq Odvv Intake    11/29/2024 10:59 AM CST - Filed by Patient   What is your current physical address in the event of a medical emergency? [facility] [address]   Are you able to take your vital signs? No   Please attach any relevant images or files    Is your employer contracted with [facility]? No         Pt reports sinus pressure, pain with radiation to left ear and neck x10 days.         HENT:  Positive for ear pain, facial swelling, sinus pain and sinus pressure.    Neck: Positive for neck pain.   Allergic/Immunologic: Positive for recurrent sinus infections.        Objective:   The physical exam was conducted virtually.  Physical Exam   Constitutional: She is oriented to person, place, and time. She is cooperative. She does not appear ill. No distress. awake  HENT:   Head: Normocephalic.   Abdominal: Normal appearance.   Neurological: She is alert and oriented to person, place, and time.   Psychiatric: Her behavior is normal. Judgment normal.       Assessment:     1. Acute recurrent sinusitis, unspecified location        Plan:   Follow up with PCP     Acute recurrent sinusitis, unspecified location  -     azithromycin (Z-MARVA) 250 MG tablet; Take 2 tablets by mouth on day 1; Take 1 tablet by mouth on days 2-5  Dispense: 6 tablet; Refill: 0

## (undated) DEVICE — DERMABOND SKIN ADHESIVE PROPEN

## (undated) DEVICE — SUT 3-0 12-18IN SILK

## (undated) DEVICE — CLIP MED TICALL

## (undated) DEVICE — DRAPE STERI INSTRUMENT 1018

## (undated) DEVICE — NDL HYPO REG 25G X 1 1/2

## (undated) DEVICE — CONTAINER SPECIMEN STRL 4OZ

## (undated) DEVICE — SPONGE GAUZE 16PLY 4X4

## (undated) DEVICE — SUT 2-0 12-18IN SILK

## (undated) DEVICE — CORD BIPOLAR 12 FOOT

## (undated) DEVICE — SUT COATED VICRYL 4/0 27IN

## (undated) DEVICE — SEE MEDLINE ITEM 157194

## (undated) DEVICE — SUT SILK 3-0 BLK PS-2 18IN

## (undated) DEVICE — ELECTRODE REM PLYHSV RETURN 9

## (undated) DEVICE — SUT VICRYL 3-0 27 SH

## (undated) DEVICE — SEE MEDLINE ITEM 157128

## (undated) DEVICE — GOWN SURGICAL X-LARGE

## (undated) DEVICE — COVER LIGHT HANDLE 80/CA

## (undated) DEVICE — SKINMARKER & RULER REGULAR X-F

## (undated) DEVICE — TRAY MINOR GEN SURG

## (undated) DEVICE — SEE MEDLINE ITEM 154981

## (undated) DEVICE — SEE MEDLINE ITEM 152622

## (undated) DEVICE — ELECTRODE BLADE INSULATED 1 IN

## (undated) DEVICE — GAUZE SPONGE PEANUT STRL

## (undated) DEVICE — SHEET EENT SPLIT